# Patient Record
Sex: MALE | Race: WHITE | NOT HISPANIC OR LATINO | Employment: OTHER | URBAN - METROPOLITAN AREA
[De-identification: names, ages, dates, MRNs, and addresses within clinical notes are randomized per-mention and may not be internally consistent; named-entity substitution may affect disease eponyms.]

---

## 2018-09-15 ENCOUNTER — APPOINTMENT (OUTPATIENT)
Dept: RADIOLOGY | Facility: MEDICAL CENTER | Age: 73
End: 2018-09-15
Attending: EMERGENCY MEDICINE
Payer: MEDICARE

## 2018-09-15 ENCOUNTER — HOSPITAL ENCOUNTER (OUTPATIENT)
Facility: MEDICAL CENTER | Age: 73
End: 2018-09-16
Attending: EMERGENCY MEDICINE | Admitting: ORAL & MAXILLOFACIAL SURGERY
Payer: MEDICARE

## 2018-09-15 DIAGNOSIS — M27.2 ABSCESS OF MANDIBLE: ICD-10-CM

## 2018-09-15 DIAGNOSIS — K04.7 DENTAL INFECTION: ICD-10-CM

## 2018-09-15 LAB
ANION GAP SERPL CALC-SCNC: 9 MMOL/L (ref 0–11.9)
BASOPHILS # BLD AUTO: 0.5 % (ref 0–1.8)
BASOPHILS # BLD: 0.03 K/UL (ref 0–0.12)
BUN SERPL-MCNC: 8 MG/DL (ref 8–22)
CALCIUM SERPL-MCNC: 9.1 MG/DL (ref 8.4–10.2)
CHLORIDE SERPL-SCNC: 101 MMOL/L (ref 96–112)
CO2 SERPL-SCNC: 26 MMOL/L (ref 20–33)
CREAT SERPL-MCNC: 1.04 MG/DL (ref 0.5–1.4)
EOSINOPHIL # BLD AUTO: 0.08 K/UL (ref 0–0.51)
EOSINOPHIL NFR BLD: 1.3 % (ref 0–6.9)
ERYTHROCYTE [DISTWIDTH] IN BLOOD BY AUTOMATED COUNT: 39.2 FL (ref 35.9–50)
GLUCOSE SERPL-MCNC: 111 MG/DL (ref 65–99)
HCT VFR BLD AUTO: 40.1 % (ref 42–52)
HGB BLD-MCNC: 14 G/DL (ref 14–18)
IMM GRANULOCYTES # BLD AUTO: 0.02 K/UL (ref 0–0.11)
IMM GRANULOCYTES NFR BLD AUTO: 0.3 % (ref 0–0.9)
INR PPP: 1.05 (ref 0.87–1.13)
LYMPHOCYTES # BLD AUTO: 0.85 K/UL (ref 1–4.8)
LYMPHOCYTES NFR BLD: 14.1 % (ref 22–41)
MCH RBC QN AUTO: 30.2 PG (ref 27–33)
MCHC RBC AUTO-ENTMCNC: 34.9 G/DL (ref 33.7–35.3)
MCV RBC AUTO: 86.6 FL (ref 81.4–97.8)
MONOCYTES # BLD AUTO: 0.65 K/UL (ref 0–0.85)
MONOCYTES NFR BLD AUTO: 10.8 % (ref 0–13.4)
NEUTROPHILS # BLD AUTO: 4.38 K/UL (ref 1.82–7.42)
NEUTROPHILS NFR BLD: 73 % (ref 44–72)
NRBC # BLD AUTO: 0 K/UL
NRBC BLD-RTO: 0 /100 WBC
PLATELET # BLD AUTO: 206 K/UL (ref 164–446)
PMV BLD AUTO: 9.1 FL (ref 9–12.9)
POTASSIUM SERPL-SCNC: 3.8 MMOL/L (ref 3.6–5.5)
PROTHROMBIN TIME: 13.6 SEC (ref 12–14.6)
RBC # BLD AUTO: 4.63 M/UL (ref 4.7–6.1)
SODIUM SERPL-SCNC: 136 MMOL/L (ref 135–145)
WBC # BLD AUTO: 6 K/UL (ref 4.8–10.8)

## 2018-09-15 PROCEDURE — G0378 HOSPITAL OBSERVATION PER HR: HCPCS

## 2018-09-15 PROCEDURE — 70355 PANORAMIC X-RAY OF JAWS: CPT

## 2018-09-15 PROCEDURE — 96366 THER/PROPH/DIAG IV INF ADDON: CPT | Mod: XU

## 2018-09-15 PROCEDURE — 700105 HCHG RX REV CODE 258: Performed by: EMERGENCY MEDICINE

## 2018-09-15 PROCEDURE — 160048 HCHG OR STATISTICAL LEVEL 1-5: Performed by: ORAL & MAXILLOFACIAL SURGERY

## 2018-09-15 PROCEDURE — 85025 COMPLETE CBC W/AUTO DIFF WBC: CPT

## 2018-09-15 PROCEDURE — 700111 HCHG RX REV CODE 636 W/ 250 OVERRIDE (IP): Performed by: ORAL & MAXILLOFACIAL SURGERY

## 2018-09-15 PROCEDURE — 87070 CULTURE OTHR SPECIMN AEROBIC: CPT

## 2018-09-15 PROCEDURE — 500380 HCHG DRAIN, PENROSE 1/4X12: Performed by: ORAL & MAXILLOFACIAL SURGERY

## 2018-09-15 PROCEDURE — 80048 BASIC METABOLIC PNL TOTAL CA: CPT

## 2018-09-15 PROCEDURE — 501838 HCHG SUTURE GENERAL: Performed by: ORAL & MAXILLOFACIAL SURGERY

## 2018-09-15 PROCEDURE — 160002 HCHG RECOVERY MINUTES (STAT): Performed by: ORAL & MAXILLOFACIAL SURGERY

## 2018-09-15 PROCEDURE — 700102 HCHG RX REV CODE 250 W/ 637 OVERRIDE(OP)

## 2018-09-15 PROCEDURE — 160038 HCHG SURGERY MINUTES - EA ADDL 1 MIN LEVEL 2: Performed by: ORAL & MAXILLOFACIAL SURGERY

## 2018-09-15 PROCEDURE — 85610 PROTHROMBIN TIME: CPT

## 2018-09-15 PROCEDURE — 700101 HCHG RX REV CODE 250

## 2018-09-15 PROCEDURE — 87075 CULTR BACTERIA EXCEPT BLOOD: CPT

## 2018-09-15 PROCEDURE — 96365 THER/PROPH/DIAG IV INF INIT: CPT | Mod: XU

## 2018-09-15 PROCEDURE — 160027 HCHG SURGERY MINUTES - 1ST 30 MINS LEVEL 2: Performed by: ORAL & MAXILLOFACIAL SURGERY

## 2018-09-15 PROCEDURE — 700111 HCHG RX REV CODE 636 W/ 250 OVERRIDE (IP): Performed by: EMERGENCY MEDICINE

## 2018-09-15 PROCEDURE — 96376 TX/PRO/DX INJ SAME DRUG ADON: CPT | Mod: XU

## 2018-09-15 PROCEDURE — 94760 N-INVAS EAR/PLS OXIMETRY 1: CPT

## 2018-09-15 PROCEDURE — 160035 HCHG PACU - 1ST 60 MINS PHASE I: Performed by: ORAL & MAXILLOFACIAL SURGERY

## 2018-09-15 PROCEDURE — A9270 NON-COVERED ITEM OR SERVICE: HCPCS | Performed by: ORAL & MAXILLOFACIAL SURGERY

## 2018-09-15 PROCEDURE — 700102 HCHG RX REV CODE 250 W/ 637 OVERRIDE(OP): Performed by: ORAL & MAXILLOFACIAL SURGERY

## 2018-09-15 PROCEDURE — 87205 SMEAR GRAM STAIN: CPT

## 2018-09-15 PROCEDURE — A9270 NON-COVERED ITEM OR SERVICE: HCPCS

## 2018-09-15 PROCEDURE — 700105 HCHG RX REV CODE 258: Performed by: ORAL & MAXILLOFACIAL SURGERY

## 2018-09-15 PROCEDURE — 99291 CRITICAL CARE FIRST HOUR: CPT

## 2018-09-15 PROCEDURE — 160036 HCHG PACU - EA ADDL 30 MINS PHASE I: Performed by: ORAL & MAXILLOFACIAL SURGERY

## 2018-09-15 PROCEDURE — 700111 HCHG RX REV CODE 636 W/ 250 OVERRIDE (IP)

## 2018-09-15 PROCEDURE — 96375 TX/PRO/DX INJ NEW DRUG ADDON: CPT | Mod: XU

## 2018-09-15 PROCEDURE — 160009 HCHG ANES TIME/MIN: Performed by: ORAL & MAXILLOFACIAL SURGERY

## 2018-09-15 RX ORDER — ATORVASTATIN CALCIUM 20 MG/1
20 TABLET, FILM COATED ORAL NIGHTLY
COMMUNITY

## 2018-09-15 RX ORDER — CYANOCOBALAMIN (VITAMIN B-12) 500 MCG
800 LOZENGE ORAL DAILY
COMMUNITY
End: 2024-01-11

## 2018-09-15 RX ORDER — MAGNESIUM OXIDE 400 MG/1
400 TABLET ORAL DAILY
COMMUNITY
End: 2024-01-11

## 2018-09-15 RX ORDER — METOPROLOL SUCCINATE 100 MG/1
100 TABLET, EXTENDED RELEASE ORAL DAILY
COMMUNITY

## 2018-09-15 RX ORDER — DEXAMETHASONE SODIUM PHOSPHATE 4 MG/ML
4 INJECTION, SOLUTION INTRA-ARTICULAR; INTRALESIONAL; INTRAMUSCULAR; INTRAVENOUS; SOFT TISSUE
Status: DISCONTINUED | OUTPATIENT
Start: 2018-09-15 | End: 2018-09-16 | Stop reason: HOSPADM

## 2018-09-15 RX ORDER — ATORVASTATIN CALCIUM 40 MG/1
80 TABLET, FILM COATED ORAL NIGHTLY
Status: DISCONTINUED | OUTPATIENT
Start: 2018-09-15 | End: 2018-09-16 | Stop reason: HOSPADM

## 2018-09-15 RX ORDER — LEVOTHYROXINE SODIUM 0.07 MG/1
150 TABLET ORAL
Status: DISCONTINUED | OUTPATIENT
Start: 2018-09-15 | End: 2018-09-16 | Stop reason: HOSPADM

## 2018-09-15 RX ORDER — AMOXICILLIN AND CLAVULANATE POTASSIUM 875; 125 MG/1; MG/1
1 TABLET, FILM COATED ORAL 2 TIMES DAILY
COMMUNITY
Start: 2018-09-13 | End: 2024-01-11

## 2018-09-15 RX ORDER — AMLODIPINE BESYLATE 10 MG/1
10 TABLET ORAL DAILY
COMMUNITY

## 2018-09-15 RX ORDER — LISINOPRIL 40 MG/1
40 TABLET ORAL DAILY
COMMUNITY

## 2018-09-15 RX ORDER — IBUPROFEN 400 MG/1
800 TABLET ORAL
Status: DISCONTINUED | OUTPATIENT
Start: 2018-09-15 | End: 2018-09-16 | Stop reason: HOSPADM

## 2018-09-15 RX ORDER — LEVOTHYROXINE SODIUM 0.12 MG/1
125 TABLET ORAL
COMMUNITY

## 2018-09-15 RX ORDER — LIDOCAINE HYDROCHLORIDE AND EPINEPHRINE 5; 5 MG/ML; UG/ML
INJECTION, SOLUTION INFILTRATION; PERINEURAL
Status: DISCONTINUED | OUTPATIENT
Start: 2018-09-15 | End: 2018-09-15 | Stop reason: HOSPADM

## 2018-09-15 RX ORDER — ONDANSETRON 2 MG/ML
4 INJECTION INTRAMUSCULAR; INTRAVENOUS EVERY 4 HOURS PRN
Status: DISCONTINUED | OUTPATIENT
Start: 2018-09-15 | End: 2018-09-16 | Stop reason: HOSPADM

## 2018-09-15 RX ORDER — HYDROCODONE BITARTRATE AND ACETAMINOPHEN 5; 325 MG/1; MG/1
1 TABLET ORAL EVERY 6 HOURS PRN
Status: ON HOLD | COMMUNITY
End: 2018-09-16

## 2018-09-15 RX ORDER — OXYCODONE HYDROCHLORIDE 5 MG/1
5 TABLET ORAL
Status: DISCONTINUED | OUTPATIENT
Start: 2018-09-15 | End: 2018-09-16 | Stop reason: HOSPADM

## 2018-09-15 RX ORDER — MIRTAZAPINE 45 MG/1
45 TABLET, FILM COATED ORAL NIGHTLY
COMMUNITY

## 2018-09-15 RX ORDER — HYDROMORPHONE HYDROCHLORIDE 2 MG/ML
0.5 INJECTION, SOLUTION INTRAMUSCULAR; INTRAVENOUS; SUBCUTANEOUS
Status: DISCONTINUED | OUTPATIENT
Start: 2018-09-15 | End: 2018-09-16 | Stop reason: HOSPADM

## 2018-09-15 RX ORDER — AMLODIPINE BESYLATE 5 MG/1
10 TABLET ORAL DAILY
Status: DISCONTINUED | OUTPATIENT
Start: 2018-09-15 | End: 2018-09-16 | Stop reason: HOSPADM

## 2018-09-15 RX ORDER — LISINOPRIL 20 MG/1
40 TABLET ORAL DAILY
Status: DISCONTINUED | OUTPATIENT
Start: 2018-09-15 | End: 2018-09-16 | Stop reason: HOSPADM

## 2018-09-15 RX ORDER — CEPHALEXIN 500 MG/1
500 CAPSULE ORAL 4 TIMES DAILY
Status: ON HOLD | COMMUNITY
Start: 2018-09-11 | End: 2018-09-16

## 2018-09-15 RX ADMIN — HYDROMORPHONE HYDROCHLORIDE 0.5 MG: 2 INJECTION, SOLUTION INTRAMUSCULAR; INTRAVENOUS; SUBCUTANEOUS at 19:36

## 2018-09-15 RX ADMIN — ATORVASTATIN CALCIUM 80 MG: 40 TABLET, FILM COATED ORAL at 20:39

## 2018-09-15 RX ADMIN — HYDROMORPHONE HYDROCHLORIDE 0.5 MG: 2 INJECTION, SOLUTION INTRAMUSCULAR; INTRAVENOUS; SUBCUTANEOUS at 16:09

## 2018-09-15 RX ADMIN — IBUPROFEN 800 MG: 400 TABLET, FILM COATED ORAL at 18:47

## 2018-09-15 RX ADMIN — HYDROMORPHONE HYDROCHLORIDE 0.5 MG: 2 INJECTION, SOLUTION INTRAMUSCULAR; INTRAVENOUS; SUBCUTANEOUS at 23:24

## 2018-09-15 RX ADMIN — HYDROCODONE BITARTRATE AND ACETAMINOPHEN 30 ML: 7.5; 325 SOLUTION ORAL at 14:35

## 2018-09-15 RX ADMIN — FENTANYL CITRATE 50 MCG: 50 INJECTION, SOLUTION INTRAMUSCULAR; INTRAVENOUS at 14:40

## 2018-09-15 RX ADMIN — SODIUM CHLORIDE 3 G: 900 INJECTION INTRAVENOUS at 23:24

## 2018-09-15 RX ADMIN — SODIUM CHLORIDE 3 G: 900 INJECTION INTRAVENOUS at 18:47

## 2018-09-15 RX ADMIN — FENTANYL CITRATE 50 MCG: 50 INJECTION, SOLUTION INTRAMUSCULAR; INTRAVENOUS at 14:30

## 2018-09-15 RX ADMIN — SODIUM CHLORIDE 3 G: 900 INJECTION INTRAVENOUS at 10:08

## 2018-09-15 ASSESSMENT — COGNITIVE AND FUNCTIONAL STATUS - GENERAL
SUGGESTED CMS G CODE MODIFIER MOBILITY: CH
SUGGESTED CMS G CODE MODIFIER DAILY ACTIVITY: CH
DAILY ACTIVITIY SCORE: 24
MOBILITY SCORE: 24

## 2018-09-15 ASSESSMENT — PATIENT HEALTH QUESTIONNAIRE - PHQ9
1. LITTLE INTEREST OR PLEASURE IN DOING THINGS: NOT AT ALL
SUM OF ALL RESPONSES TO PHQ9 QUESTIONS 1 AND 2: 0
2. FEELING DOWN, DEPRESSED, IRRITABLE, OR HOPELESS: NOT AT ALL

## 2018-09-15 ASSESSMENT — PAIN SCALES - GENERAL
PAINLEVEL_OUTOF10: 4
PAINLEVEL_OUTOF10: 0
PAINLEVEL_OUTOF10: 5
PAINLEVEL_OUTOF10: 6
PAINLEVEL_OUTOF10: 4
PAINLEVEL_OUTOF10: 5
PAINLEVEL_OUTOF10: 4
PAINLEVEL_OUTOF10: 4
PAINLEVEL_OUTOF10: 8

## 2018-09-15 NOTE — OR NURSING
1415 - Patient admitted from OR to PACU - drowsy with spontaneous respirations and clear breath sounds bilaterally. Gauze from inside left lower mouth area. Gauze fluffs dressing to left lower jaw area - both clean, dry, and intact. Per RACHEL - there is a penrose drain under the gauze in left lower jaw area. Ice pack to left lower jaw area. Patient denies pain, denies nausea. Report from RACHEL Rojas and Dr. Hale. Iv noted to right wrist area- infusing well. VS as noted.     1430 - Patient less drowsy. Pain 8/10 - medicating with Fentanyl and Hydrocodone as noted on MAR. Denies nausea . VS as noted.     1445 - Patient resting with eyes closed to easily awakened and cooperative then back to resting with eyes closed. Pain now 4/10 and tolerable. Denies nausea. Tolerating small sips of water. VS as noted.     1500 - Remains as above. VS as noted.     1515 -  Resting with eyes closed to easily awakened and cooperative then back to resting with eyes closed. . Pain 4/10 and tolerable. Denies nausea. Declines any ice chips or water at this time. Gauze from mouth and to left lower jaw area remains clean, dry, and intact. Ice pack in place. IV remains infusing well. VS as noted. Meets criteria for transfer to room. Called [facility] - awaiting RN availability.     1520 - Report to RAMYA Anderson.     1525 - Patient transferred via bed to room on O2 via NC at 2 lpm by another PACU RN and Transport Tech.

## 2018-09-15 NOTE — OR SURGEON
Immediate Post OP Note    PreOp Diagnosis:   1.  Infected tooth number 18.  2.  Left sublingual space abscess.  3.  Left submandibular cellulitis.    PostOp Diagnosis:   1.  Vertical fracture and Infected tooth number 18.  2.  Left sublingual space abscess.  3.  Left submandibular cellulitis.    Procedure(s):  1.  Left submandibular incision and drainage of the sublingual space abscess with Penrose drain placement.   2.  Surgical extraction of tooth number 18.    - Wound Class: Clean with Drain    Surgeon(s):  Adarsh Gee M.D.    Anesthesiologist/Type of Anesthesia:  Anesthesiologist: Jannie Hale M.D.  Anesthesia Technician: Amina Chowdhury/General    Surgical Staff:  Circulator: Silvia Vance R.N.  Scrub Person: Lori Lees    Specimens:  Swabs to microbiology for gs/c/s.    Drains:  1/4 inch penrose drain.    Estimated Blood Loss: Minimal    Findings: Scant pus from the submandibular I&D site.  Penrose drains placed and secured with a 3-0 chromic gut suture.  Obvious vertical fracture of tooth number 18.  Extraction clearly indicated.      Complications: None    Disposition:  Admission to the floor for IV antibiotics, pain control, wound care, and observation.  Anticipate discharge home after 24-48 hours.          9/15/2018 2:10 PM Adarsh Gee M.D.

## 2018-09-15 NOTE — ED NOTES
Med rec complete per patient medication list  Allergies reviewed    Patient was on K[facility] for a couple days and [facility] and told [facility] to start [facility] and D/C Keflex  Stopped vitamins a few ago due to not swallowing well

## 2018-09-15 NOTE — CONSULTS
Oral Surgery Consult    Requesting Physician:  Dr. Justin Gold, [facility] ED    Reason for Consultation:  Left lower jaw pain and swelling.     ID/HPI:  The patient is a 73 y.o. male with hypertension and regular dental care presented to the [facility] ED today complaining of left jaw pain and swelling.  Cephalexin was started this last week by Dr. Mac Flores, but the swelling seemed to worsen.  He was seen yesterday at the [facility] ED and was given IV Unasyn and sent home with Augmentin.  He feels that the swelling and pain is worsening and presented to the [facility] ED for evaluation.  He was seen by Dr. Gold who felt there might be an abscess.  A panoramic mandibular x-ray was obtained and shows overall intact dentition, but a periapical radiolucency associated with the mesial root of tooth number 18.  Difficulty swallowing with pain and limited oral opening has been experienced most recently.  Unasyn 3 g IV administered in the ED.    Medical History: Hypertension, hypercholesterolemia, and hypothyroidism.    Surgical History:  Denies    Medications:  Levothyroxine, amlodipine, metoprolol, lisinopril, atorvastatin, mirtazapine, ASA 81 mg, Vit E/D/B complex.    Allergies:  NKDA    Social History:  Pt lives in [place], recently relocated from [place].  Denies smoking, drinking alcohol, and illicit drug use.      ROS:  Negative for symtpomatic CV, Pulm, GI, , Neuro, or Endocrine disease.   Denies headache, dizziness, CP, SOB.  Recent difficulty swallowing with pain and limited oral opening.      Examination:  T 99.6 F, /87, Pulse 77, Resp 20, SpO2 92% on RA, Ht 5-10, Wt 226 lb  General:  Well developed, well nourished, overweight, age appropriate, no distress.  HEENT:    Head atraumatic, normocephalic.    Eyes:  PERRL, EOMI, normal vision.    Ears:  Hearing grossly intact to finger rub bilaterally.    Nose:  Nares patent bilaterally.    Oropharynx:  Lingual  tenderness in the left lower lingual region with fluctuant swelling.   The oropharynx is clear without shift of midline structures.  The floor of mouth is otherwise soft without tongue elevation.  Submandibular:  Tender on the left, but soft.  Maxillofacial:  Soft swelling of the left mandible extending to the mental region.  Mild trismus. No obvious dental decay.  Tooth number 18 is midly tender.    Chest:  Symmetric chest rise.  No tenderness.  CV:  RRR without murmur by report.  Lungs:  Breathing clear and unlabored.    Abdomen:  Soft, nontender, nondistended.   Extremities:  Warm, without edema, peripheral pulses present.  Neurologic:  CN II-XII grossly intact bilaterally.  Normal mentation and conversation.  Nonfocal exam.      Labs:    1. CBC with WBC 6.0, Hct 40.1, Plt 206, Neuts 73%  2. CMP with glucose 111    Imaging:  Panoramic Mandible - PA lucency of tooth number 18.      Assessment/Plan:  This 73 year old man with extensive dental decay has developed an acute dental infection with abscess formation in the deep left sublingual space with a normal WBC count with mild neutrophilia.  The patient will be admitted for IV antibiotics and will require surgical treatment to include incision and drainage of the abscess and extraction of the causeative tooth, #18.  We discussed saving the tooth with an endodontic consultation, but that removal of the tooth is most predictable way of treating this infection.  The patient is amenable to this treatment recommendation and wishes to proceed with extraction and incision and drainage as discussed.      Consent discussion:  The procedure, risks, benefits, and alternatives have been described and discussed in detail.  Risks include, but are not limited to, persistent infection, bleeding, need for reoperation, postoperative antibiotics, and nerve injury resulting in possible weakness of facial musculature and altered sensation of the facial and oral tissues.  All questions  were answered and the operating room has been scheduled.  NPO status is appropriate for general anesthesia.      Postoperative Disposition:  Admission to the floor observation, pain control, and IV antibiotics.  I will follow closely during the hospital course and advise on drain removal, discharge instructions, and antibiotics.      Thanks to all those involved with the care, admission and discharge of this patient.      Adarsh Gee DDS, MD

## 2018-09-15 NOTE — ED NOTES
Júnior Gasca Ripley 73 y.o. male   Ambulatory to room    Chief Complaint   Patient presents with   • Dental Pain     x1 week, L lower. Placed on Keflex 9/11-9/13.  Switched to Amoxicillin 9/13 and [facility].  Worsening.    • Facial Swelling      Chart placed for ERP eval.

## 2018-09-15 NOTE — ED PROVIDER NOTES
ED Provider Note    CHIEF COMPLAINT  Chief Complaint   Patient presents with   • Dental Pain     x1 week, L lower. Placed on Keflex 9/11-9/13.  Switched to Amoxicillin 9/13 and [facility].  Worsening.    • Facial Swelling       HPI  Júnior Norton is a 73 y.o. male who presents complaining of toothache.  It hurts on the lower left side.  It has been present for approximately a week, but is particularly bad at this time.  Pain radiates locally.  Rates pain as moderate to severe.  It is worse to chew or bite down.  However, patient is able to take orals.  Patient saw his dentist who prescribed Keflex.  The patient did not have any improvement with this.  He was subsequently seen at the [facility] and was given a dose of IV antibiotics.  Antibiotic was then changed to Augmentin.  Is been taking this but seems to be getting worse.  Therefore he comes the emergency department for further evaluation.  No associated breathing/swallowing difficulty.  No fever.  No nausea, vomiting, chest pain, shortness of breath, weakness, numbness, bowel or bladder changes. There are no other complaints.    PAST MEDICAL HISTORY  Past Medical History:   Diagnosis Date   • Depression    • Hyperlipemia    • Hypertension    • Hypothyroid        FAMILY HISTORY  No family history on file.    SOCIAL HISTORY  Social History     Social History   • Marital status:      Spouse name: N/A   • Number of children: N/A   • Years of education: N/A     Social History Main Topics   • Smoking status: Not on file   • Smokeless tobacco: Not on file   • Alcohol use Not on file   • Drug use: Unknown   • Sexual activity: Not on file     Other Topics Concern   • Not on file     Social History Narrative   • No narrative on file       SURGICAL HISTORY  No past surgical history on file.    CURRENT MEDICATIONS  Home Medications     Reviewed by Ishmael Ortega (Pharmacy Tech) on 09/15/18 at 0905  Med List  "Status: Complete   Medication Last Dose Status   amLODIPine (NORVASC) 10 MG Tab 9/15/2018 Active   amoxicillin-clavulanate (AUGMENTIN) 875-125 MG Tab 9/15/2018 Active   aspirin EC (ECOTRIN) 81 MG Tablet Delayed Response 9/15/2018 Active   atorvastatin (LIPITOR) 80 MG tablet 9/14/2018 Active   B Complex Vitamins (VITAMIN B COMPLEX PO) 3 DAYS Active   Calcium Carbonate (CALCIUM 600 PO) 3 DAYS Active   cephALEXin (KEFLEX) 500 MG Cap 9/13/2018 Active   HYDROcodone-acetaminophen (NORCO) 5-325 MG Tab per tablet 9/15/2018 Active   levothyroxine (SYNTHROID) 150 MCG Tab 9/15/2018 Active   lisinopril (PRINIVIL, ZESTRIL) 40 MG tablet 9/15/2018 Active   magnesium oxide (MAG-OX) 400 MG Tab 3 DAYS Active   metoprolol SR (TOPROL XL) 100 MG TABLET SR 24 HR 9/15/2018 Active   mirtazapine (REMERON) 45 MG tablet 9/14/2018 Active   vitamin D (CHOLECALCIFEROL) 1000 UNIT Tab 3 DAYS Active   Vitamin E 400 UNIT Tab 3 DAYS Active                ALLERGIES  No Known Allergies    REVIEW OF SYSTEMS  See HPI for further details. Review of systems as above, otherwise all other systems are negative.     PHYSICAL EXAM  VITAL SIGNS: /87   Pulse 77   Temp 37.6 °C (99.6 °F)   Resp 20   Ht 1.778 m (5' 10\")   Wt 102.9 kg (226 lb 13.7 oz)   SpO2 92%   BMI 32.55 kg/m²     Constitutional: Well appearing patient in mild distress from pain.  Not toxic, nor ill in appearance.  HENT: Mucus membranes moist.  Oropharynx is clear.  There are scattered caries.  There is moderate left-sided over the angle of the mandible.  There is tenderness over the left mandible.  Tongue is normal.  Floor of the mouth is normal.  Submental space is soft.  Posterior pharynx is normal.  Patient is tolerating secretions without difficulty.  Eyes: Pupils equally round.  No scleral icterus.   Neck: Full nontender range of motion; no meningismus.  Lymphatic: No cervical lymphadenopathy noted.   Cardiovascular: Regular heart rate and rhythm.  No murmurs, rubs, nor gallop " appreciated.   Thorax & Lungs: Lungs are clear to auscultation with good air movement bilaterally.  No wheeze, rhonchi, nor rales.   Abdomen: Soft, not distended  Skin: No facial cellulitis.  Extremities/Musculoskeletal: No sign of trauma.  Musculoskeletal: Grossly normal.  Neurologic: Alert & appropriate.  Strength is grossly normal.  Gait is normal about the ER.  Psychiatric: Normal affect appropriate for the clinical situation.    PROCEDURES      MEDICAL RECORD  I have reviewed patient's medical record and pertinent results are listed above.    COURSE & MEDICAL DECISION MAKING  I have reviewed any medical record information, laboratory studies and radiographic results as noted above.    Patient presents today after seeing his [facility]  He has been taking antibiotics without any significant improvement.  He says that he is been worsening after taking antibiotics.  I consulted the on-call oral surgeon.  Dr. Barrow came to the emergency department plans to take the patient to the OR for incision and drainage.    FINAL IMPRESSION  1. Acute toothache  2. Dental caries  3.  Dental infection  4.       Electronically signed by: Justin Gold, 9/15/2018 12:00 PM

## 2018-09-15 NOTE — OP REPORT
DATE OF SERVICE:  09/15/2018    PREOPERATIVE DIAGNOSES:  1.  Infected tooth #18.  2.  Left sublingual space abscess.  3.  Left submandibular cellulitis.    POSTOPERATIVE DIAGNOSES:  1.  Vertical fracture and infected tooth #18.  2.  Left sublingual space abscess.  3.  Left submandibular space cellulitis.    PROCEDURES:  1.  Left submandibular incision and drainage of the sublingual space abscess   with Penrose drain placement.  2.  Surgical extraction of tooth #18.    SURGEON:  Adarsh Gee MD    ASSISTANT SURGEON:  None    ANESTHESIA:  General anesthesia with oral endotracheal intubation.    ANESTHESIOLOGIST:  Jannie Hale MD    INDICATION:  This patient is a 73-year-old man with a recent history of left   lower second molar pain and subsequent swelling with the development of   trismus and painful swallowing.  He had seen his dentist, Dr. Mac Flores and   was prescribed oral antibiotics.  He continued to worsen and was seen at the   [facility] emergency department where he was given an infusion of   Unasyn and continued on Augmentin.  He continued to worsen and presented to   the [facility] emergency department where he   was seen by Dr. Justin Gold.  I was called for consultation.  He was   examined and found to have left lingual tender, swelling, and tenderness of   tooth #18.  A panoramic mandibular x-ray revealed apical lucency suggestive of   a chronic infection, which should become acute.  The findings suggested   either pulpal necrosis or a dental fracture of tooth #18 with subsequent acute   infection.  Considering the trismus extraction of tooth #18 and extraoral   incision and drainage of the sublingual abscess was recommended to be done   under general anesthesia here at [facility] operating room.  Patient   understood the nature of the infection and elected to proceed with extraction   and incision and drainage today.  He had been n.p.o.  except for black coffee   several hours prior to presenting.  The procedure risks, benefits, and   alternatives were described and discussed in detail.  Risks include, but are   not limited to infection, bleeding, need for reoperation, and nerve injury   resulting in altered sensation of the lower lip, chin, and tongue regions and   possibly weakness of the lower lip.  Formal written consent was obtained.    PROCEDURE DESCRIPTION:  Patient was taken to the operating room at [facility] operating room where he was placed in   the supine position and general anesthesia with oral endotracheal intubation   was performed without complication by Dr. Hale.  IV Unasyn had been   administered preoperatively.  He was positioned, prepped, and draped in the   usual fashion for an intraoral and extraoral approach to a deep space oral   infection.    The oropharynx was suctioned and a moistened throat pack was placed.    Approximately 8 mL of 1% lidocaine with 1:100,000 dilution of epinephrine was   administered subcutaneously in the left submandibular region and via a left   mandibular block with local infiltration.  A 1.5 cm incision was made through   skin and subcutaneous tissues with a scalpel.  Blunt dissection was carried   through the superficial layer of the deep cervical fascia and carried out to   the inferior border of the mandible where the sublingual space was entered.    Scant pus was drained.  The site was irrigated.  A Penrose drain was placed   and secured to the skin with a single 3-0 chromic gut suture.    Attention was turned to the mouth.  A bite block was placed.  Tooth #18 was   extracted with periodontal separation, mesial elevator luxation, and forceps   separation and extraction of the teeth in fragments.  A vertical dental   fracture was noted clearly through the central groove of the tooth.  The   socket was curetted thoroughly and irrigated with saline.  The  gingiva was   sutured once with a single 3-0 chromic gut suture.  Gauze was placed over the   extraction site for hemostasis.  The throat pack and the bite block were   removed and the oropharynx was suctioned.  The patient was undraped and   cleansed.  The left submandibular site was dressed with Kerlix, fluff gauze,   and elastic tape.  The patient was turned over to anesthesia for emergence and   extubation.  This was performed without complication by Dr. Hale.  The   patient was transferred to the PACU awake and in stable condition.    ESTIMATED BLOOD LOSS:  Minimal.    SPECIMENS:  Swabs to microbiology for Gram stain, culture, and sensitivity.    DRAINS:  Quarter inch Penrose drain.    COMPLICATIONS:  None.    DISPOSITION:  After recovery in the PACU, the patient will be admitted to the   floor for IV antibiotics, pain control, wound care, and observation.  I   anticipate discharge home after 24-48 hours with drain removal.       ____________________________________     JIGAR JIMENES MD,DDS    BIBIANA / JEROME    DD:  09/15/2018 14:33:58  DT:  09/15/2018 15:37:07    D#:  [id number]  Job#:  [id number]

## 2018-09-15 NOTE — OR NURSING
1225 - Patient admitted from ED to Pre- Op status in PACU. Awake and cooperative. Swelling noted to left lower jaw area. Per patient pain 4-5/10 and tolerable at this time. Confirmed name, , allergies,  NPO status, and surgical procedure with patient. Patient had seen Dr. Gee in ED - Surgical consent signed by patient.     1310 - Dr. Hale at bedside discussing Anesthesia plan with patient - consent signed.      1315 - Spouse to bedside.     1320 - Dr. Gee at bedside. He signed surgical consent.     1325 - Report to RACHEL Rojas and patient to OR.

## 2018-09-16 VITALS
HEIGHT: 70 IN | DIASTOLIC BLOOD PRESSURE: 69 MMHG | RESPIRATION RATE: 18 BRPM | TEMPERATURE: 99.2 F | OXYGEN SATURATION: 95 % | HEART RATE: 73 BPM | WEIGHT: 226.85 LBS | SYSTOLIC BLOOD PRESSURE: 140 MMHG | BODY MASS INDEX: 32.48 KG/M2

## 2018-09-16 PROBLEM — M27.2 ABSCESS OF MANDIBLE: Status: RESOLVED | Noted: 2018-09-16 | Resolved: 2018-09-16

## 2018-09-16 LAB
GRAM STN SPEC: NORMAL
SIGNIFICANT IND 70042: NORMAL
SITE SITE: NORMAL
SOURCE SOURCE: NORMAL

## 2018-09-16 PROCEDURE — 700111 HCHG RX REV CODE 636 W/ 250 OVERRIDE (IP): Performed by: ORAL & MAXILLOFACIAL SURGERY

## 2018-09-16 PROCEDURE — 700102 HCHG RX REV CODE 250 W/ 637 OVERRIDE(OP): Performed by: ORAL & MAXILLOFACIAL SURGERY

## 2018-09-16 PROCEDURE — 700105 HCHG RX REV CODE 258: Performed by: ORAL & MAXILLOFACIAL SURGERY

## 2018-09-16 PROCEDURE — 700101 HCHG RX REV CODE 250: Performed by: ORAL & MAXILLOFACIAL SURGERY

## 2018-09-16 PROCEDURE — 96366 THER/PROPH/DIAG IV INF ADDON: CPT

## 2018-09-16 PROCEDURE — A9270 NON-COVERED ITEM OR SERVICE: HCPCS | Performed by: ORAL & MAXILLOFACIAL SURGERY

## 2018-09-16 PROCEDURE — G0378 HOSPITAL OBSERVATION PER HR: HCPCS

## 2018-09-16 RX ORDER — IBUPROFEN 800 MG/1
800 TABLET ORAL EVERY 8 HOURS PRN
Qty: 8 TAB | Refills: 1 | Status: SHIPPED | OUTPATIENT
Start: 2018-09-16 | End: 2018-09-19

## 2018-09-16 RX ORDER — BACITRACIN ZINC AND POLYMYXIN B SULFATE 500; 1000 [USP'U]/G; [USP'U]/G
OINTMENT TOPICAL 2 TIMES DAILY
Status: DISCONTINUED | OUTPATIENT
Start: 2018-09-16 | End: 2018-09-16 | Stop reason: HOSPADM

## 2018-09-16 RX ORDER — HYDROCODONE BITARTRATE AND ACETAMINOPHEN 5; 325 MG/1; MG/1
1-2 TABLET ORAL EVERY 6 HOURS PRN
Qty: 12 TAB | Refills: 0 | Status: SHIPPED | OUTPATIENT
Start: 2018-09-16 | End: 2018-09-19

## 2018-09-16 RX ADMIN — SODIUM CHLORIDE 3 G: 900 INJECTION INTRAVENOUS at 05:31

## 2018-09-16 RX ADMIN — SODIUM CHLORIDE 3 G: 900 INJECTION INTRAVENOUS at 11:48

## 2018-09-16 RX ADMIN — OXYCODONE HYDROCHLORIDE 5 MG: 5 TABLET ORAL at 11:47

## 2018-09-16 RX ADMIN — BACITRACIN ZINC AND POLYMYXIN B SULFATE: 500; 10000 OINTMENT TOPICAL at 10:30

## 2018-09-16 RX ADMIN — LISINOPRIL 40 MG: 20 TABLET ORAL at 05:32

## 2018-09-16 RX ADMIN — AMLODIPINE BESYLATE 10 MG: 5 TABLET ORAL at 05:31

## 2018-09-16 RX ADMIN — IBUPROFEN 800 MG: 400 TABLET, FILM COATED ORAL at 07:44

## 2018-09-16 RX ADMIN — OXYCODONE HYDROCHLORIDE 5 MG: 5 TABLET ORAL at 05:32

## 2018-09-16 RX ADMIN — OXYCODONE HYDROCHLORIDE 5 MG: 5 TABLET ORAL at 08:37

## 2018-09-16 RX ADMIN — LEVOTHYROXINE SODIUM 150 MCG: 75 TABLET ORAL at 05:32

## 2018-09-16 RX ADMIN — IBUPROFEN 800 MG: 400 TABLET, FILM COATED ORAL at 11:46

## 2018-09-16 ASSESSMENT — PAIN SCALES - GENERAL
PAINLEVEL_OUTOF10: 5
PAINLEVEL_OUTOF10: 5

## 2018-09-16 NOTE — DISCHARGE INSTRUCTIONS
Discharge Instructions    Discharged to home by car with relative. Discharged via wheelchair, hospital escort: Yes.  Special equipment needed: Not Applicable    Be sure to schedule a follow-up appointment with your primary care doctor or any specialists as instructed.     Discharge Plan:   Influenza Vaccine Indication: Patient Refuses    I understand that a diet low in cholesterol, fat, and sodium is recommended for good health. Unless I have been given specific instructions below for another diet, I accept this instruction as my diet prescription.   Other diet: diet as tolerated    Special Instructions: None    · Is patient discharged on Warfarin / Coumadin?   No   Warm compresses to face every 4 hours while awake  Change bandage twice a day and apply antibiotic ointment  Warm salt water rinses every 4 hours while awake  Call Monday For appointment with .#[phone]  Call md for fever,increased swelling,purulent drainage  Depression / Suicide Risk    As you are discharged from this [facility] facility, it is important to learn how to keep safe from harming yourself.    Recognize the warning signs:  · Abrupt changes in personality, positive or negative- including increase in energy   · Giving away possessions  · Change in eating patterns- significant weight changes-  positive or negative  · Change in sleeping patterns- unable to sleep or sleeping all the time   · Unwillingness or inability to communicate  · Depression  · Unusual sadness, discouragement and loneliness  · Talk of wanting to die  · Neglect of personal appearance   · Rebelliousness- reckless behavior  · Withdrawal from people/activities they love  · Confusion- inability to concentrate     If you or a loved one observes any of these behaviors or has concerns about self-harm, here's what you can do:  · Talk about it- your feelings and reasons for harming yourself  · Remove any means that you might use to hurt yourself (examples: pills, rope,  extension cords, firearm)  · Get professional help from the community (Mental Health, Substance Abuse, psychological counseling)  · Do not be alone:Call your Safe Contact- someone whom you trust who will be there for you.  · Call your local CRISIS HOTLINE 622-9418 or 684-967-4439  · Call your local Children's Mobile Crisis Response Team [place] (136) 449-4359 or www.TBS  · Call the toll free National Suicide Prevention Hotlines   · National Suicide Prevention Lifeline 646-615-LDVG (4984)  · National Hope Line Network 800-SUICIDE (990-4703)

## 2018-09-16 NOTE — PROGRESS NOTES
Received from pacu via bed.aa&o x 4/  Iv fluids running.  Gauze drsg cdi to lt face/jaw area.  Also has gauze inside of lt side of his mouth.  C/o pain.will medicate with dilaudid per pt request.  Taking in sips of water and ice chips.does not want anything more at this time.  Hob up >30 degrees.ice pack to lt side of face in place from pacu.  Wife at bedside.went over meds and orders with both pt and wife.

## 2018-09-16 NOTE — PROGRESS NOTES
Pt voided 500 cc in the br earlier.up with sba.was also able to swallow ibuprofen whole with water.

## 2018-09-16 NOTE — PROGRESS NOTES
Oral Surgery Progress    POD #1  Operation:  Left submandibular I&D of the sublingual space, extraction of tooth number 18.  Complaints:  Neck soreness near the incision.  No fever or chills.  No bad taste.  Issues overnight:  None  Pt is ambulating, voiding, and tolerating PO.  Pain is controlled with Oxycodone 5 mg.    Exam:  AF, VSSN  Pt appears well in no distress.  Surgical sites clean and hemostatic.  Swelling improved.  Drainage serosanguinous.  Drain removed and site dressed.    Labs:  None new.    A/P:  Pt doing well POD #1 s/p extraction and I&D abscess and meets criteria for discharge.      Discharge home with oral care supplies.  Warm compresses to swollen areas 2-3 times daily for 5 days.  Diet soft.  Salt water rinses 3-4 times daily.  Brush teeth BID carefully around sutured sites.    Follow-up:  1 week in office.      Prescription:  1.  Pt has Augmentin   2.  Percocet 5/325 mg (12) 1-2 po q6h prn pain.    Adarsh Gee DDS, MD  [phone]

## 2018-09-16 NOTE — CARE PLAN
Problem: Safety  Goal: Will remain free from injury  Outcome: PROGRESSING AS EXPECTED      Problem: Venous Thromboembolism (VTW)/Deep Vein Thrombosis (DVT) Prevention:  Goal: Patient will participate in Venous Thrombosis (VTE)/Deep Vein Thrombosis (DVT)Prevention Measures  Outcome: PROGRESSING AS EXPECTED   09/15/18 2300   Mechanical/VTE Prophylaxis   Mechanical Prophylaxis  SCDs, Sequential Compression Device     Patient ambulating

## 2018-09-16 NOTE — PROGRESS NOTES
Report received from day shift RN. Patient laying in bed, watching TV. States pain is 4/10 to left lower jaw. Medicated per MAR. Dressing to left lower jaw has moderate amount of serosanguinous fluid. Patient states that he would like to hold off on changing gauze in mouth, oral rinses, and brushing teeth at this time. Education provided to patient. Will continue to monitor.

## 2018-09-16 NOTE — PROGRESS NOTES
Went over all d/c instructions and meds with pt and wife.all questions answered.  Pt.chose to walk out with wife

## 2018-09-16 NOTE — DISCHARGE SUMMARY
Oral & Facial Surgery   Discharge Summary  Pt Name:   Júnior Norton  Pt mrn:  [id number]    Admitting Service:  Oral and Maxillofacial Surgery.    Discharge Diagnoses:  1.  Infected tooth number 18 due to fracture.  2.  Left sublingual space abscess.    Procedures Performed:  1.  Surgical extraction of tooth number 18.  2.  Extraoral incision and drainage of the left sublingual space abscess.     Surgeon:  Adarsh Gee MD, DDS    Pt evaluated for facial infection caused by tooth number 18.   Radiographic and clinical findings were consistent with the admitting diagnosis.  It was decided that the pt would benefit from surgery at [facility].  The patient was taken to the operating room for the above procedures.  The surgery was performed without complication and the pt was admitted to the floor for IV Abx, IV pain meds and neurological observation.  On hospital day #1 pt was ambulating without assistance, voiding and taking adequate nutrition and fluids.  His pain was controled with PO pain meds and he was felt to be neurologically stable.  The patient was prepared for discharge.     Instructions:    1.  Diet soft.  2.  Wound and oral care with salt water rinses and tooth brushing.  3.  Head elevation and warm compresses for 3-4 days.  4.  OK to shower with dressing off, then redress.   5.  Call or return to hospital if fever, chills, increasing swelling, difficulty swallowing.    Rx:    1.  Percocet 5/325 mg 1-2 PO q6h prn pain (#12).  2.  Ibuprofen 800 mg 1 PO q8h prn pain (8)  2.  Pt has Augmenting for 7 days filled on prior prescription.    F/U in office in 4-5    Adarsh Gee MD, DDS

## 2018-09-17 LAB
BACTERIA WND AEROBE CULT: NORMAL
GRAM STN SPEC: NORMAL
SIGNIFICANT IND 70042: NORMAL
SITE SITE: NORMAL
SOURCE SOURCE: NORMAL

## 2018-09-22 LAB
BACTERIA SPEC ANAEROBE CULT: ABNORMAL
BACTERIA SPEC ANAEROBE CULT: ABNORMAL
SIGNIFICANT IND 70042: ABNORMAL
SITE SITE: ABNORMAL
SOURCE SOURCE: ABNORMAL

## 2020-08-14 ENCOUNTER — OFFICE VISIT (OUTPATIENT)
Dept: DERMATOLOGY | Facility: IMAGING CENTER | Age: 75
End: 2020-08-14
Payer: MEDICARE

## 2020-08-14 VITALS — HEIGHT: 69 IN | TEMPERATURE: 98.7 F | BODY MASS INDEX: 33.33 KG/M2 | WEIGHT: 225 LBS

## 2020-08-14 DIAGNOSIS — L82.1 SEBORRHEIC KERATOSES: ICD-10-CM

## 2020-08-14 DIAGNOSIS — D22.9 MULTIPLE NEVI: ICD-10-CM

## 2020-08-14 DIAGNOSIS — Z85.828 HISTORY OF BASAL CELL CANCER: ICD-10-CM

## 2020-08-14 DIAGNOSIS — Z12.83 SKIN CANCER SCREENING: ICD-10-CM

## 2020-08-14 DIAGNOSIS — L81.4 LENTIGINES: ICD-10-CM

## 2020-08-14 DIAGNOSIS — D18.01 CHERRY ANGIOMA: ICD-10-CM

## 2020-08-14 DIAGNOSIS — L57.0 ACTINIC KERATOSES: ICD-10-CM

## 2020-08-14 PROCEDURE — 17000 DESTRUCT PREMALG LESION: CPT | Performed by: NURSE PRACTITIONER

## 2020-08-14 PROCEDURE — 17003 DESTRUCT PREMALG LES 2-14: CPT | Performed by: NURSE PRACTITIONER

## 2020-08-14 PROCEDURE — 99202 OFFICE O/P NEW SF 15 MIN: CPT | Mod: 25 | Performed by: NURSE PRACTITIONER

## 2020-08-14 ASSESSMENT — ENCOUNTER SYMPTOMS
CHILLS: 0
FEVER: 0
DIAPHORESIS: 0
WEIGHT LOSS: 0

## 2020-08-14 NOTE — PROGRESS NOTES
Dermatology New Patient Visit    Chief Complaint   Patient presents with   • Establish Care       Subjective:     HPI:   Júnior Norton is a 74 y.o. male presenting for    New patient RAJESH   No new concerns today   Last exam 2 years ago , regularly skin exams once a year   History of skin cancer: Yes, Details: BCC right cheek  Mohs 10 years ago , BCC on right hip 15 yrs ago   Several AK treated through the years   History of biopsies:Yes, Details:   History of blistering/severe sunburns:Yes, Details:   Family history of skin cancer:No  Family history of atypical moles:No    Tobacco use: Former smoker. Quit 53  years ago. Smoked 3 pack per day for 10 years  Alcohol use:denies alcohol consumption  Allergies:No        Past Medical History:   Diagnosis Date   • Depression    • Hyperlipemia    • Hypertension    • Hypothyroid        Current Outpatient Medications on File Prior to Visit   Medication Sig Dispense Refill   • levothyroxine (SYNTHROID) 150 MCG Tab Take 150 mcg by mouth Every morning on an empty stomach.     • amLODIPine (NORVASC) 10 MG Tab Take 10 mg by mouth every day.     • metoprolol SR (TOPROL XL) 100 MG TABLET SR 24 HR Take 100 mg by mouth every day.     • lisinopril (PRINIVIL, ZESTRIL) 40 MG tablet Take 40 mg by mouth every day.     • atorvastatin (LIPITOR) 80 MG tablet Take 80 mg by mouth every evening.     • mirtazapine (REMERON) 45 MG tablet Take 45 mg by mouth every evening.     • aspirin EC (ECOTRIN) 81 MG Tablet Delayed Response Take 81 mg by mouth every 48 hours.     • Vitamin E 400 UNIT Tab Take 800 Units by mouth every day.     • vitamin D (CHOLECALCIFEROL) 1000 UNIT Tab Take 1,000 Units by mouth every day.     • B Complex Vitamins (VITAMIN B COMPLEX PO) Take 1 Tab by mouth every day.     • Calcium Carbonate (CALCIUM 600 PO) Take 1 Tab by mouth every day.     • magnesium oxide (MAG-OX) 400 MG Tab Take 400 mg by mouth every day.     • amoxicillin-clavulanate (AUGMENTIN) 875-125 MG Tab Take  1 Tab by mouth 2 times a day. 10 DAYS       No current facility-administered medications on file prior to visit.        No Known Allergies    History reviewed. No pertinent family history.    Social History     Socioeconomic History   • Marital status:      Spouse name: Not on file   • Number of children: Not on file   • Years of education: Not on file   • Highest education level: Not on file   Occupational History   • Not on file   Social Needs   • Financial resource strain: Not on file   • Food insecurity     Worry: Not on file     Inability: Not on file   • Transportation needs     Medical: Not on file     Non-medical: Not on file   Tobacco Use   • Smoking status: Not on file   Substance and Sexual Activity   • Alcohol use: Not on file   • Drug use: Not on file   • Sexual activity: Not on file   Lifestyle   • Physical activity     Days per week: Not on file     Minutes per session: Not on file   • Stress: Not on file   Relationships   • Social connections     Talks on phone: Not on file     Gets together: Not on file     Attends Voodoo service: Not on file     Active member of club or organization: Not on file     Attends meetings of clubs or organizations: Not on file     Relationship status: Not on file   • Intimate partner violence     Fear of current or ex partner: Not on file     Emotionally abused: Not on file     Physically abused: Not on file     Forced sexual activity: Not on file   Other Topics Concern   • Not on file   Social History Narrative   • Not on file       Review of Systems   Constitutional: Negative for chills, diaphoresis, fever, malaise/fatigue and weight loss.   Skin: Negative for itching and rash.        Objective:     A full mucocutaneous exam was completed including: scalp, hair, ears, face, eyelids, conjunctiva, lips, gums/tongue/oropharynx, neck, chest, abdomen, back, left and right upper extremities (including hands/digits and fingernails), left and right lower extremities  "(including feet/toes, toenails), buttocks, including external genitalia  with the following pertinent findings listed below. Remaining above-listed examined areas within normal limits / negative for rashes or lesions.    Temp 37.1 °C (98.7 °F)   Ht 1.753 m (5' 9\")   Wt 102.1 kg (225 lb)     Physical Exam   Constitutional: He is oriented to person, place, and time and well-developed, well-nourished, and in no distress. No distress.   HENT:   Head: Normocephalic.       Right Ear: External ear normal.   Left Ear: External ear normal.   Mouth/Throat: Oropharynx is clear and moist.   Eyes: Conjunctivae are normal.   Neck: Neck supple.   Pulmonary/Chest: Effort normal.   Lymphadenopathy:     He has no cervical adenopathy.   Neurological: He is alert and oriented to person, place, and time.   Skin: Skin is warm and dry. No rash noted. There is erythema.   Ill-defined erythematous gritty/scaly papules over the forearms    Several scattered tan and light brown macules over trunk and extremities    Multiple light brown medium brown skin-colored macules papules scattered over the trunk >> extremities. All with benign-appearing pigment network patterns on dermoscopy    Several scattered 1-3mm bright red macules and thin papules on the trunk    Several tan skin-colored stuck-on waxy papules scattered on the trunk and extremities           Psychiatric: Mood and affect normal.   Vitals reviewed.      DATA: none applicable to review    Assessment and Plan:     1. Actinic keratoses  CRYOTHERAPY:  Risks (including, but not limited to: hypo or hyperpigmentation, redness, blister, blood blister, recurrence, need for further treatment, infection, scar) and benefits of cryotherapy discussed. Patient verbally agreed to proceed with treatment. 2 cryotherapy freeze thaw cycles of 10 seconds were applied to 7 lesions on face and forearms  with cryac. Patient tolerated procedure well. Aftercare instructions given.      2. Cherry angioma  - " Benign-appearing nature of lesions discussed. Advised to return to clinic for any new or concerning changes.      3. Lentigines  - Discussed benign nature of lesions, related to sun exposure  - Discussed sun protection, hand out provided      4. Multiple nevi  - Benign-appearing nature of lesions discussed. Advised to return to clinic for any new or concerning changes.  - ABCDE's of melanoma discussed      5. Seborrheic keratoses  - Benign-appearing nature of lesions discussed. Advised to return to clinic for any new or concerning changes.      6. History of basal cell cancer  Skin education as below    7. Skin cancer screening  Skin cancer education  - discussed importance of sun protective clothing, eyewear  - discussed importance of daily use of broad spectrum sunscreen with SPF 30 or greater, as well as need for reapplication ~every 2 hours when exposed to UVR  - discussed importance of regular self-exams, ideally once per month, every 12 months exams in clinic  - ABCDE's of melanoma discussed  - patient to bring any new or concerning lesions to my attention  - Patient educational handout provided and reviewed with patient        Followup: Return in about 1 year (around 8/14/2021) for RAJESH or sooner for any concerns.    RANDALL Rey.

## 2021-01-15 DIAGNOSIS — Z23 NEED FOR VACCINATION: ICD-10-CM

## 2021-02-03 ENCOUNTER — IMMUNIZATION (OUTPATIENT)
Dept: FAMILY PLANNING/WOMEN'S HEALTH CLINIC | Facility: IMMUNIZATION CENTER | Age: 76
End: 2021-02-03
Attending: INTERNAL MEDICINE
Payer: MEDICARE

## 2021-02-03 DIAGNOSIS — Z23 NEED FOR VACCINATION: ICD-10-CM

## 2021-02-03 DIAGNOSIS — Z23 ENCOUNTER FOR VACCINATION: Primary | ICD-10-CM

## 2021-02-03 PROCEDURE — 0001A PFIZER SARS-COV-2 VACCINE: CPT

## 2021-02-03 PROCEDURE — 91300 PFIZER SARS-COV-2 VACCINE: CPT

## 2021-02-24 ENCOUNTER — IMMUNIZATION (OUTPATIENT)
Dept: FAMILY PLANNING/WOMEN'S HEALTH CLINIC | Facility: IMMUNIZATION CENTER | Age: 76
End: 2021-02-24
Attending: INTERNAL MEDICINE
Payer: MEDICARE

## 2021-02-24 DIAGNOSIS — Z23 ENCOUNTER FOR VACCINATION: Primary | ICD-10-CM

## 2021-02-24 PROCEDURE — 0002A PFIZER SARS-COV-2 VACCINE: CPT

## 2021-02-24 PROCEDURE — 91300 PFIZER SARS-COV-2 VACCINE: CPT

## 2022-06-14 ENCOUNTER — OFFICE VISIT (OUTPATIENT)
Dept: DERMATOLOGY | Facility: IMAGING CENTER | Age: 77
End: 2022-06-14
Payer: MEDICARE

## 2022-06-14 DIAGNOSIS — L57.0 ACTINIC KERATOSIS: ICD-10-CM

## 2022-06-14 DIAGNOSIS — D22.9 MULTIPLE NEVI: ICD-10-CM

## 2022-06-14 DIAGNOSIS — D18.01 CHERRY ANGIOMA: ICD-10-CM

## 2022-06-14 DIAGNOSIS — L82.1 SK (SEBORRHEIC KERATOSIS): ICD-10-CM

## 2022-06-14 DIAGNOSIS — Z12.83 SKIN CANCER SCREENING: ICD-10-CM

## 2022-06-14 DIAGNOSIS — L85.3 XEROSIS CUTIS: ICD-10-CM

## 2022-06-14 DIAGNOSIS — L81.4 LENTIGINES: ICD-10-CM

## 2022-06-14 PROCEDURE — 17003 DESTRUCT PREMALG LES 2-14: CPT | Performed by: NURSE PRACTITIONER

## 2022-06-14 PROCEDURE — 99213 OFFICE O/P EST LOW 20 MIN: CPT | Mod: 25 | Performed by: NURSE PRACTITIONER

## 2022-06-14 PROCEDURE — 17000 DESTRUCT PREMALG LESION: CPT | Performed by: NURSE PRACTITIONER

## 2022-06-14 NOTE — PROGRESS NOTES
DERMATOLOGY NOTE  FOLLOW UP VISIT       Chief complaint: ernestina            History of skin cancer: Yes, Details: BCC right cheek  Mohs approx 2012, BCC on right hip approx 1991 or 1992    Several AK treated through the years   History of biopsies:Yes, Details:   History of blistering/severe sunburns:Yes, Details:   Family history of skin cancer:No  Family history of atypical moles:No     Tobacco use: Former smoker. Quit 53  years ago. Smoked 3 pack per day for 10 years  Alcohol use:denies alcohol consumption  Allergies:No        No Known Allergies     MEDICATIONS:  Medications relevant to specialty reviewed.     REVIEW OF SYSTEMS:   Positive for skin (see HPI)  Negative for fevers and chills       EXAM:  There were no vitals taken for this visit.  Constitutional: Well-developed, well-nourished, and in no distress.     A total body skin exam was performed excluding the genitals per patient preference and including the following areas: head (including face), neck, chest, abdomen, groin/buttocks, back, bilateral upper extremities, and bilateral lower extremities with the following pertinent findings listed below and/or in assessment/plan.     Ill-defined erythematous gritty/scaly papule over the forehead crown vertex,Lt temple, bilateral preauricular    -sun exposed skin of trunk and b/l upper, lower extremities and face with scattered clinically benign light brown reticulated macules all of which were morphologically similar and none of which were suspicious for skin cancer today on exam    Several scattered 1-3mm bright red macules and thin papules on the trunk     Several tan medium brown skin-colored stuck-on waxy papules scattered on the trunk     Multiple tan medium brown dark brown skin-colored macules papules scattered over the trunk and extremities, All with benign-appearing pigment network patterns on dermoscopy    Xerosis cutis bilateral lower extremity       IMPRESSION / PLAN:    1. Actinic keratosis  - NMSC  education/counseling   CRYOTHERAPY:  Risks (including, but not limited to: skin discoloration, redness, blister, blood blister, recurrence, need for further treatment, infection, scar) and benefits of cryotherapy discussed. Patient verbally agreed to proceed with treatment. 1 cryotherapy freeze thaw cycles of 10 seconds were applied to 14 lesions on areas as noted on exam with cryac. Patient tolerated procedure well. Aftercare instructions given--no specific care needed unless irritated during healing process, can apply Vaseline with small band-aid if needed.      2. Lentigines  - Benign-appearing nature of lesions discussed during exam.   - Advised to continue to monitor for any return to clinic for new or concerning changes.      3. Cherry angioma  - Benign-appearing nature of lesions discussed during exam.   - Advised to continue to monitor for any return to clinic for new or concerning changes.      4. Multiple nevi  - Benign-appearing nature of lesions discussed during exam.   - Advised to continue to monitor for any return to clinic for new or concerning changes.  - ABCDE's of melanoma discussed      5. SK (seborrheic keratosis)  - Benign-appearing nature of lesions discussed during exam.   - Advised to continue to monitor for any return to clinic for new or concerning changes.      6. Xerosis cutis  - advised adequate moisturizing with emollients    7. Skin cancer screening  Skin cancer education  - discussed importance of sun protective clothing, eyewear in addition to the use of broad spectrum sunscreen with SPF 30 or greater, as well as need for reapplication ~every 2 hours when exposed to UVR  - discussed importance following up for any new or changing lesions as noted in handout given, but every 12 months exams in clinic in the setting of dermatologic history  - ABCDE's of melanoma discussed/handout given          Discussed risks, benefits, alternative treatments as well as common side effects associated  with prescribed treatment, Patient verbalized understanding and agrees with plan regarding the above        Please note that this dictation was created using voice recognition software. I have made every reasonable attempt to correct obvious errors, but I expect that there are errors of grammar and possibly content that I did not discover before finalizing the note.      Return to clinic in: Return in about 6 weeks (around 7/26/2022) for AK recheck, RAJESH 1 year. and as needed for any new or changing skin lesions.

## 2022-07-26 ENCOUNTER — OFFICE VISIT (OUTPATIENT)
Dept: DERMATOLOGY | Facility: IMAGING CENTER | Age: 77
End: 2022-07-26
Payer: MEDICARE

## 2022-07-26 DIAGNOSIS — L57.0 ACTINIC KERATOSIS: ICD-10-CM

## 2022-07-26 PROCEDURE — 17000 DESTRUCT PREMALG LESION: CPT | Performed by: NURSE PRACTITIONER

## 2022-07-26 PROCEDURE — 17003 DESTRUCT PREMALG LES 2-14: CPT | Performed by: NURSE PRACTITIONER

## 2022-07-26 NOTE — PROGRESS NOTES
DERMATOLOGY NOTE  FOLLOW UP VISIT       Chief complaint:   Follow up, improved with some remaining AKs      Spot check  forehead crown vertex,Lt temple, bilateral preauricular         History of skin cancer: Yes, Details: BCC right cheek  Mohs approx 2012, BCC on right hip approx 1991 or 1992    Several AK treated through the years   History of biopsies:Yes, Details:   History of blistering/severe sunburns:Yes, Details:   Family history of skin cancer:No  Family history of atypical moles:No     Tobacco use: Former smoker. Quit 53  years ago. Smoked 3 pack per day for 10 years  Alcohol use:denies alcohol consumption  Allergies:No        No Known Allergies     MEDICATIONS:  Medications relevant to specialty reviewed.     REVIEW OF SYSTEMS:   Positive for skin (see HPI)  Negative for fevers and chills       EXAM:  There were no vitals taken for this visit.  Constitutional: Well-developed, well-nourished, and in no distress.     A focused skin exam was performed including the affected areas of the head (including face). Notable findings on exam today listed below and/or in assessment/plan.     Ill-defined erythematous gritty/scaly papules over the forehead, bilateral temples, R preauricular region and crown      IMPRESSION / PLAN:    1. Actinic keratosis  CRYOTHERAPY:  Risks (including, but not limited to: skin discoloration, redness, blister, blood blister, recurrence, need for further treatment, infection, scar) and benefits of cryotherapy discussed. Patient verbally agreed to proceed with treatment. 1 cryotherapy freeze thaw cycles of 10 seconds were applied to 7 lesions on areas as noted on exam with cryac. Patient tolerated procedure well. Aftercare instructions given--no specific care needed unless irritated during healing process, can apply Vaseline with small band-aid if needed.          Discussed risks, benefits, alternative treatments as well as common side effects associated with prescribed treatment, Patient  verbalized understanding and agrees with plan regarding the above        Please note that this dictation was created using voice recognition software. I have made every reasonable attempt to correct obvious errors, but I expect that there are errors of grammar and possibly content that I did not discover before finalizing the note.      Return to clinic in: Return for PRN. and as needed for any new or changing skin lesions.

## 2022-11-10 ENCOUNTER — PATIENT MESSAGE (OUTPATIENT)
Dept: HEALTH INFORMATION MANAGEMENT | Facility: OTHER | Age: 77
End: 2022-11-10

## 2023-08-29 ENCOUNTER — OFFICE VISIT (OUTPATIENT)
Dept: DERMATOLOGY | Facility: IMAGING CENTER | Age: 78
End: 2023-08-29
Payer: MEDICARE

## 2023-08-29 DIAGNOSIS — L82.1 SK (SEBORRHEIC KERATOSIS): ICD-10-CM

## 2023-08-29 DIAGNOSIS — L85.1 STUCCO KERATOSES: ICD-10-CM

## 2023-08-29 DIAGNOSIS — D18.01 CHERRY ANGIOMA: ICD-10-CM

## 2023-08-29 DIAGNOSIS — Z85.828 HISTORY OF BASAL CELL CARCINOMA (BCC): ICD-10-CM

## 2023-08-29 DIAGNOSIS — Z12.83 SKIN CANCER SCREENING: ICD-10-CM

## 2023-08-29 DIAGNOSIS — L57.0 ACTINIC KERATOSIS: ICD-10-CM

## 2023-08-29 DIAGNOSIS — L85.3 XEROSIS CUTIS: ICD-10-CM

## 2023-08-29 DIAGNOSIS — D22.9 MULTIPLE NEVI: ICD-10-CM

## 2023-08-29 DIAGNOSIS — L81.4 LENTIGINES: ICD-10-CM

## 2023-08-29 PROCEDURE — 17003 DESTRUCT PREMALG LES 2-14: CPT | Performed by: NURSE PRACTITIONER

## 2023-08-29 PROCEDURE — 99213 OFFICE O/P EST LOW 20 MIN: CPT | Mod: 25 | Performed by: NURSE PRACTITIONER

## 2023-08-29 PROCEDURE — 17000 DESTRUCT PREMALG LESION: CPT | Performed by: NURSE PRACTITIONER

## 2023-08-29 NOTE — PROGRESS NOTES
DERMATOLOGY NOTE  FOLLOW UP VISIT       Chief complaint: ernestina      HPI:  skin lesion , gets irrigated every time he shaves   Location:  left cheek   Time present: 1 month   Painful lesion: No  Itching lesion: No  Enlarging lesion: No  Anything make it better or worse?no       Hx of AK  forehead crown vertex,Lt temple, bilateral preauricular  History of skin cancer: Yes, Details: BCC right cheek  Mohs approx 2012, BCC on right hip approx 1991 or 1992    Several AK treated through the years   History of biopsies:Yes, Details:   History of blistering/severe sunburns:Yes, Details:   Family history of skin cancer:No  Family history of atypical moles:No     Tobacco use: Former smoker. Quit 53  years ago. Smoked 3 pack per day for 10 years  Alcohol use:denies alcohol consumption  Allergies:No        No Known Allergies     MEDICATIONS:  Medications relevant to specialty reviewed.     REVIEW OF SYSTEMS:   Positive for skin (see HPI)  Negative for fevers and chills       EXAM:  There were no vitals taken for this visit.  Constitutional: Well-developed, well-nourished, and in no distress.     A total body skin exam was performed excluding the genitals per patient preference and including the following areas: head (including face), neck, chest, abdomen, groin/buttocks, back, bilateral upper extremities, and bilateral lower extremities with the following pertinent findings listed below and/or in assessment/plan.         -sun exposed skin of trunk and b/l upper, lower extremities and face with scattered clinically benign light brown reticulated macules all of which were morphologically similar and none of which were suspicious for skin cancer today on exam  Several scattered 1-3mm bright red macules and thin papules on the trunk, scalp and extremities   Several tan medium brown skin-colored stuck-on waxy papules scattered on the trunk and extremities  Multiple tan medium brown dark brown skin-colored macules papules scattered over  the trunk and extremities, All with benign-appearing pigment network patterns on dermoscopy  Xerosis cutis bilateral lower extremity   Stucco keratosis bilateral lower extremity   Ill-defined erythematous gritty/scaly papule over the forehead, left lateral cheek , left temple       IMPRESSION / PLAN:      1. Actinic keratosis  CRYOTHERAPY:  Risks (including, but not limited to: skin discoloration, redness, blister, blood blister, recurrence, need for further treatment, infection, scar) and benefits of cryotherapy discussed. Patient verbally agreed to proceed with treatment. 1 cryotherapy freeze thaw cycles of 10 seconds were applied to 6 lesions on areas as noted on exam with cryac. Patient tolerated procedure well. Aftercare instructions given--no specific care needed unless irritated during healing process, can apply Vaseline with small band-aid if needed.      2. Lentigines  - Benign-appearing nature of lesions discussed during exam.   - Advised to continue to monitor for any return to clinic for new or concerning changes.      3. Multiple nevi  - Benign-appearing nature of lesions discussed during exam.   - Advised to continue to monitor for any return to clinic for new or concerning changes.  - ABCDE's of melanoma discussed/handout given      4. SK (seborrheic keratosis)  - Benign-appearing nature of lesions discussed during exam.   - Advised to continue to monitor for any return to clinic for new or concerning changes.      5. Cherry angioma  - Benign-appearing nature of lesions discussed during exam.   - Advised to continue to monitor for any return to clinic for new or concerning changes.      6. Xerosis cutis      7. Stucco keratoses  - Benign-appearing nature of lesions discussed during exam.   - Advised to continue to monitor for any return to clinic for new or concerning changes.      8. History of basal cell carcinoma (BCC)  Annual TSEs    9. Skin cancer screening  Skin cancer education  discussed  importance of sun protective clothing, eyewear in addition to the use of broad spectrum sunscreen with SPF 30 or greater, as well as need for reapplication ~every 2 hours when exposed to UVR/handout given  discussed importance following up for any new or changing lesions as noted in handout given, but every 12 months exams in clinic in the setting of dermatologic history  ABCDE's of melanoma discussed/handout given            Discussed risks associated with LN2, Patient verbalized understanding and agrees with plan regarding the above    I have performed a physical exam and reviewed and updated ROS and Plan today (8/29/2023). In review of dermatology visit (6/14/2022), there are no changes except as documented above.       Please note that this dictation was created using voice recognition software. I have made every reasonable attempt to correct obvious errors, but I expect that there are errors of grammar and possibly content that I did not discover before finalizing the note.      Return to clinic in: Return in about 1 year (around 8/29/2024) for RAJESH. and as needed for any new or changing skin lesions.

## 2023-11-29 ENCOUNTER — PATIENT MESSAGE (OUTPATIENT)
Dept: HEALTH INFORMATION MANAGEMENT | Facility: OTHER | Age: 78
End: 2023-11-29

## 2024-01-10 ENCOUNTER — HOSPITAL ENCOUNTER (INPATIENT)
Facility: MEDICAL CENTER | Age: 79
LOS: 1 days | DRG: 813 | End: 2024-01-12
Attending: EMERGENCY MEDICINE | Admitting: STUDENT IN AN ORGANIZED HEALTH CARE EDUCATION/TRAINING PROGRAM
Payer: COMMERCIAL

## 2024-01-10 ENCOUNTER — APPOINTMENT (OUTPATIENT)
Dept: RADIOLOGY | Facility: MEDICAL CENTER | Age: 79
DRG: 813 | End: 2024-01-10
Attending: EMERGENCY MEDICINE
Payer: COMMERCIAL

## 2024-01-10 DIAGNOSIS — R31.0 GROSS HEMATURIA: ICD-10-CM

## 2024-01-10 DIAGNOSIS — R31.9 HEMATURIA, UNSPECIFIED TYPE: ICD-10-CM

## 2024-01-10 DIAGNOSIS — D69.6 THROMBOCYTOPENIA (HCC): ICD-10-CM

## 2024-01-10 DIAGNOSIS — I48.91 ATRIAL FIBRILLATION, UNSPECIFIED TYPE (HCC): Primary | ICD-10-CM

## 2024-01-10 DIAGNOSIS — I48.19 PERSISTENT ATRIAL FIBRILLATION (HCC): ICD-10-CM

## 2024-01-10 LAB
ALBUMIN SERPL BCP-MCNC: 4.7 G/DL (ref 3.2–4.9)
ALBUMIN/GLOB SERPL: 1.7 G/DL
ALP SERPL-CCNC: 78 U/L (ref 30–99)
ALT SERPL-CCNC: 39 U/L (ref 2–50)
ANION GAP SERPL CALC-SCNC: 14 MMOL/L (ref 7–16)
APPEARANCE UR: ABNORMAL
AST SERPL-CCNC: 40 U/L (ref 12–45)
BACTERIA #/AREA URNS HPF: NEGATIVE /HPF
BILIRUB SERPL-MCNC: 0.4 MG/DL (ref 0.1–1.5)
BILIRUB UR QL STRIP.AUTO: ABNORMAL
BUN SERPL-MCNC: 23 MG/DL (ref 8–22)
CALCIUM ALBUM COR SERPL-MCNC: 9 MG/DL (ref 8.5–10.5)
CALCIUM SERPL-MCNC: 9.6 MG/DL (ref 8.5–10.5)
CHLORIDE SERPL-SCNC: 100 MMOL/L (ref 96–112)
CO2 SERPL-SCNC: 25 MMOL/L (ref 20–33)
COLOR UR: ABNORMAL
CREAT SERPL-MCNC: 0.92 MG/DL (ref 0.5–1.4)
EKG IMPRESSION: NORMAL
EPI CELLS #/AREA URNS HPF: NEGATIVE /HPF
GFR SERPLBLD CREATININE-BSD FMLA CKD-EPI: 86 ML/MIN/1.73 M 2
GLOBULIN SER CALC-MCNC: 2.8 G/DL (ref 1.9–3.5)
GLUCOSE SERPL-MCNC: 101 MG/DL (ref 65–99)
GLUCOSE UR STRIP.AUTO-MCNC: NEGATIVE MG/DL
HYALINE CASTS #/AREA URNS LPF: ABNORMAL /LPF
KETONES UR STRIP.AUTO-MCNC: 15 MG/DL
LEUKOCYTE ESTERASE UR QL STRIP.AUTO: ABNORMAL
MAGNESIUM SERPL-MCNC: 2.3 MG/DL (ref 1.5–2.5)
MICRO URNS: ABNORMAL
NITRITE UR QL STRIP.AUTO: NEGATIVE
PH UR STRIP.AUTO: 5.5 [PH] (ref 5–8)
PHOSPHATE SERPL-MCNC: 3.1 MG/DL (ref 2.5–4.5)
POTASSIUM SERPL-SCNC: 3.9 MMOL/L (ref 3.6–5.5)
PROT SERPL-MCNC: 7.5 G/DL (ref 6–8.2)
PROT UR QL STRIP: 100 MG/DL
RBC # URNS HPF: >150 /HPF
RBC UR QL AUTO: ABNORMAL
SODIUM SERPL-SCNC: 139 MMOL/L (ref 135–145)
SP GR UR STRIP.AUTO: 1.01
TROPONIN T SERPL-MCNC: 15 NG/L (ref 6–19)
UROBILINOGEN UR STRIP.AUTO-MCNC: 0.2 MG/DL
WBC #/AREA URNS HPF: ABNORMAL /HPF

## 2024-01-10 PROCEDURE — 71045 X-RAY EXAM CHEST 1 VIEW: CPT

## 2024-01-10 PROCEDURE — 84484 ASSAY OF TROPONIN QUANT: CPT

## 2024-01-10 PROCEDURE — 99285 EMERGENCY DEPT VISIT HI MDM: CPT

## 2024-01-10 PROCEDURE — 700117 HCHG RX CONTRAST REV CODE 255: Performed by: EMERGENCY MEDICINE

## 2024-01-10 PROCEDURE — 84153 ASSAY OF PSA TOTAL: CPT

## 2024-01-10 PROCEDURE — 85025 COMPLETE CBC W/AUTO DIFF WBC: CPT

## 2024-01-10 PROCEDURE — 93005 ELECTROCARDIOGRAM TRACING: CPT

## 2024-01-10 PROCEDURE — 93005 ELECTROCARDIOGRAM TRACING: CPT | Performed by: EMERGENCY MEDICINE

## 2024-01-10 PROCEDURE — 81001 URINALYSIS AUTO W/SCOPE: CPT

## 2024-01-10 PROCEDURE — 83735 ASSAY OF MAGNESIUM: CPT

## 2024-01-10 PROCEDURE — 84100 ASSAY OF PHOSPHORUS: CPT

## 2024-01-10 PROCEDURE — 85730 THROMBOPLASTIN TIME PARTIAL: CPT

## 2024-01-10 PROCEDURE — 74177 CT ABD & PELVIS W/CONTRAST: CPT

## 2024-01-10 PROCEDURE — 94760 N-INVAS EAR/PLS OXIMETRY 1: CPT

## 2024-01-10 PROCEDURE — 85055 RETICULATED PLATELET ASSAY: CPT

## 2024-01-10 PROCEDURE — 80053 COMPREHEN METABOLIC PANEL: CPT

## 2024-01-10 PROCEDURE — 85610 PROTHROMBIN TIME: CPT

## 2024-01-10 PROCEDURE — 36415 COLL VENOUS BLD VENIPUNCTURE: CPT

## 2024-01-10 RX ADMIN — IOHEXOL 100 ML: 350 INJECTION, SOLUTION INTRAVENOUS at 23:35

## 2024-01-11 ENCOUNTER — APPOINTMENT (OUTPATIENT)
Dept: CARDIOLOGY | Facility: MEDICAL CENTER | Age: 79
DRG: 813 | End: 2024-01-11
Attending: STUDENT IN AN ORGANIZED HEALTH CARE EDUCATION/TRAINING PROGRAM
Payer: COMMERCIAL

## 2024-01-11 PROBLEM — I16.0 HYPERTENSIVE URGENCY: Status: ACTIVE | Noted: 2024-01-11

## 2024-01-11 PROBLEM — N40.0 BPH (BENIGN PROSTATIC HYPERPLASIA): Status: ACTIVE | Noted: 2024-01-11

## 2024-01-11 PROBLEM — F32.9 MAJOR DEPRESSIVE DISORDER: Status: ACTIVE | Noted: 2024-01-11

## 2024-01-11 PROBLEM — R31.9 HEMATURIA: Status: ACTIVE | Noted: 2024-01-11

## 2024-01-11 PROBLEM — I48.91 AF (ATRIAL FIBRILLATION) (HCC): Status: ACTIVE | Noted: 2024-01-11

## 2024-01-11 PROBLEM — E78.5 DYSLIPIDEMIA: Status: ACTIVE | Noted: 2024-01-11

## 2024-01-11 PROBLEM — E03.9 HYPOTHYROIDISM: Status: ACTIVE | Noted: 2024-01-11

## 2024-01-11 PROBLEM — D69.6 THROMBOCYTOPENIA (HCC): Status: ACTIVE | Noted: 2024-01-11

## 2024-01-11 LAB
APTT PPP: 33.8 SEC (ref 24.7–36)
BASOPHILS # BLD AUTO: 0.6 % (ref 0–1.8)
BASOPHILS # BLD: 0.03 K/UL (ref 0–0.12)
EOSINOPHIL # BLD AUTO: 0.06 K/UL (ref 0–0.51)
EOSINOPHIL NFR BLD: 1.2 % (ref 0–6.9)
ERYTHROCYTE [DISTWIDTH] IN BLOOD BY AUTOMATED COUNT: 42.2 FL (ref 35.9–50)
HAV IGM SERPL QL IA: NORMAL
HBV CORE IGM SER QL: NORMAL
HBV SURFACE AG SER QL: NORMAL
HCT VFR BLD AUTO: 43.2 % (ref 42–52)
HCV AB SER QL: NORMAL
HGB BLD-MCNC: 15.1 G/DL (ref 14–18)
HIV 1+2 AB+HIV1 P24 AG SERPL QL IA: NORMAL
IMM GRANULOCYTES # BLD AUTO: 0.01 K/UL (ref 0–0.11)
IMM GRANULOCYTES NFR BLD AUTO: 0.2 % (ref 0–0.9)
INR PPP: 0.99 (ref 0.87–1.13)
LDH SERPL L TO P-CCNC: 223 U/L (ref 107–266)
LV EJECT FRACT  99904: 65
LV EJECT FRACT MOD 2C 99903: 63.67
LV EJECT FRACT MOD 4C 99902: 66.21
LV EJECT FRACT MOD BP 99901: 65.48
LYMPHOCYTES # BLD AUTO: 1.02 K/UL (ref 1–4.8)
LYMPHOCYTES NFR BLD: 20.8 % (ref 22–41)
MCH RBC QN AUTO: 31.2 PG (ref 27–33)
MCHC RBC AUTO-ENTMCNC: 35 G/DL (ref 32.3–36.5)
MCV RBC AUTO: 89.3 FL (ref 81.4–97.8)
MONOCYTES # BLD AUTO: 0.48 K/UL (ref 0–0.85)
MONOCYTES NFR BLD AUTO: 9.8 % (ref 0–13.4)
NEUTROPHILS # BLD AUTO: 3.31 K/UL (ref 1.82–7.42)
NEUTROPHILS NFR BLD: 67.4 % (ref 44–72)
NRBC # BLD AUTO: 0 K/UL
NRBC BLD-RTO: 0 /100 WBC (ref 0–0.2)
PLATELET # BLD AUTO: 174 K/UL (ref 164–446)
PLATELET # BLD AUTO: 54 K/UL (ref 164–446)
PLATELETS.RETICULATED NFR BLD AUTO: 32.3 % (ref 0.6–13.1)
PMV BLD AUTO: 12.8 FL (ref 9–12.9)
PROTHROMBIN TIME: 13.2 SEC (ref 12–14.6)
RBC # BLD AUTO: 4.84 M/UL (ref 4.7–6.1)
TSH SERPL DL<=0.005 MIU/L-ACNC: 3.29 UIU/ML (ref 0.38–5.33)
WBC # BLD AUTO: 4.9 K/UL (ref 4.8–10.8)

## 2024-01-11 PROCEDURE — 302140 GU CART

## 2024-01-11 PROCEDURE — 700102 HCHG RX REV CODE 250 W/ 637 OVERRIDE(OP)

## 2024-01-11 PROCEDURE — 85397 CLOTTING FUNCT ACTIVITY: CPT

## 2024-01-11 PROCEDURE — 99223 1ST HOSP IP/OBS HIGH 75: CPT | Mod: AI,GC | Performed by: STUDENT IN AN ORGANIZED HEALTH CARE EDUCATION/TRAINING PROGRAM

## 2024-01-11 PROCEDURE — 99285 EMERGENCY DEPT VISIT HI MDM: CPT

## 2024-01-11 PROCEDURE — 94760 N-INVAS EAR/PLS OXIMETRY 1: CPT

## 2024-01-11 PROCEDURE — 51702 INSERT TEMP BLADDER CATH: CPT

## 2024-01-11 PROCEDURE — 83615 LACTATE (LD) (LDH) ENZYME: CPT

## 2024-01-11 PROCEDURE — 700111 HCHG RX REV CODE 636 W/ 250 OVERRIDE (IP): Performed by: STUDENT IN AN ORGANIZED HEALTH CARE EDUCATION/TRAINING PROGRAM

## 2024-01-11 PROCEDURE — 36415 COLL VENOUS BLD VENIPUNCTURE: CPT

## 2024-01-11 PROCEDURE — 80074 ACUTE HEPATITIS PANEL: CPT

## 2024-01-11 PROCEDURE — 93306 TTE W/DOPPLER COMPLETE: CPT | Mod: 26 | Performed by: INTERNAL MEDICINE

## 2024-01-11 PROCEDURE — 93306 TTE W/DOPPLER COMPLETE: CPT

## 2024-01-11 PROCEDURE — 700102 HCHG RX REV CODE 250 W/ 637 OVERRIDE(OP): Performed by: STUDENT IN AN ORGANIZED HEALTH CARE EDUCATION/TRAINING PROGRAM

## 2024-01-11 PROCEDURE — 770020 HCHG ROOM/CARE - TELE (206)

## 2024-01-11 PROCEDURE — 96374 THER/PROPH/DIAG INJ IV PUSH: CPT

## 2024-01-11 PROCEDURE — 303105 HCHG CATHETER EXTRA

## 2024-01-11 PROCEDURE — A9270 NON-COVERED ITEM OR SERVICE: HCPCS | Performed by: STUDENT IN AN ORGANIZED HEALTH CARE EDUCATION/TRAINING PROGRAM

## 2024-01-11 PROCEDURE — G0475 HIV COMBINATION ASSAY: HCPCS

## 2024-01-11 PROCEDURE — 99221 1ST HOSP IP/OBS SF/LOW 40: CPT | Performed by: STUDENT IN AN ORGANIZED HEALTH CARE EDUCATION/TRAINING PROGRAM

## 2024-01-11 PROCEDURE — A9270 NON-COVERED ITEM OR SERVICE: HCPCS

## 2024-01-11 PROCEDURE — 84443 ASSAY THYROID STIM HORMONE: CPT

## 2024-01-11 RX ORDER — BISACODYL 10 MG
10 SUPPOSITORY, RECTAL RECTAL
Status: DISCONTINUED | OUTPATIENT
Start: 2024-01-11 | End: 2024-01-12 | Stop reason: HOSPADM

## 2024-01-11 RX ORDER — LEVOTHYROXINE SODIUM 0.12 MG/1
125 TABLET ORAL
Status: DISCONTINUED | OUTPATIENT
Start: 2024-01-11 | End: 2024-01-12 | Stop reason: HOSPADM

## 2024-01-11 RX ORDER — AMLODIPINE BESYLATE 5 MG/1
10 TABLET ORAL DAILY
Status: DISCONTINUED | OUTPATIENT
Start: 2024-01-11 | End: 2024-01-12 | Stop reason: HOSPADM

## 2024-01-11 RX ORDER — LABETALOL HYDROCHLORIDE 5 MG/ML
10 INJECTION, SOLUTION INTRAVENOUS EVERY 6 HOURS PRN
Status: DISCONTINUED | OUTPATIENT
Start: 2024-01-11 | End: 2024-01-12 | Stop reason: HOSPADM

## 2024-01-11 RX ORDER — ATORVASTATIN CALCIUM 20 MG/1
20 TABLET, FILM COATED ORAL EVERY EVENING
Status: DISCONTINUED | OUTPATIENT
Start: 2024-01-11 | End: 2024-01-12 | Stop reason: HOSPADM

## 2024-01-11 RX ORDER — POLYETHYLENE GLYCOL 3350 17 G/17G
1 POWDER, FOR SOLUTION ORAL
Status: DISCONTINUED | OUTPATIENT
Start: 2024-01-11 | End: 2024-01-12 | Stop reason: HOSPADM

## 2024-01-11 RX ORDER — MIRTAZAPINE 30 MG/1
45 TABLET, FILM COATED ORAL NIGHTLY
Status: DISCONTINUED | OUTPATIENT
Start: 2024-01-11 | End: 2024-01-12 | Stop reason: HOSPADM

## 2024-01-11 RX ORDER — ACETAMINOPHEN 325 MG/1
650 TABLET ORAL EVERY 6 HOURS PRN
Status: DISCONTINUED | OUTPATIENT
Start: 2024-01-11 | End: 2024-01-12 | Stop reason: HOSPADM

## 2024-01-11 RX ORDER — METOPROLOL SUCCINATE 25 MG/1
100 TABLET, EXTENDED RELEASE ORAL DAILY
Status: DISCONTINUED | OUTPATIENT
Start: 2024-01-11 | End: 2024-01-12 | Stop reason: HOSPADM

## 2024-01-11 RX ORDER — LABETALOL HYDROCHLORIDE 5 MG/ML
10 INJECTION, SOLUTION INTRAVENOUS ONCE
Status: COMPLETED | OUTPATIENT
Start: 2024-01-11 | End: 2024-01-11

## 2024-01-11 RX ORDER — AMOXICILLIN 250 MG
2 CAPSULE ORAL 2 TIMES DAILY
Status: DISCONTINUED | OUTPATIENT
Start: 2024-01-11 | End: 2024-01-12 | Stop reason: HOSPADM

## 2024-01-11 RX ORDER — LISINOPRIL 20 MG/1
40 TABLET ORAL DAILY
Status: DISCONTINUED | OUTPATIENT
Start: 2024-01-11 | End: 2024-01-12 | Stop reason: HOSPADM

## 2024-01-11 RX ADMIN — LISINOPRIL 40 MG: 20 TABLET ORAL at 16:27

## 2024-01-11 RX ADMIN — LEVOTHYROXINE SODIUM 125 MCG: 0.12 TABLET ORAL at 06:00

## 2024-01-11 RX ADMIN — METOPROLOL SUCCINATE 100 MG: 50 TABLET, EXTENDED RELEASE ORAL at 06:00

## 2024-01-11 RX ADMIN — AMLODIPINE BESYLATE 10 MG: 5 TABLET ORAL at 05:58

## 2024-01-11 RX ADMIN — ATORVASTATIN CALCIUM 20 MG: 20 TABLET, FILM COATED ORAL at 18:41

## 2024-01-11 RX ADMIN — MIRTAZAPINE 45 MG: 30 TABLET, FILM COATED ORAL at 22:18

## 2024-01-11 RX ADMIN — LABETALOL HYDROCHLORIDE 10 MG: 5 INJECTION, SOLUTION INTRAVENOUS at 01:55

## 2024-01-11 ASSESSMENT — ENCOUNTER SYMPTOMS
VOMITING: 0
ABDOMINAL PAIN: 0
DIZZINESS: 0
PALPITATIONS: 0
CHILLS: 0
DOUBLE VISION: 0
SHORTNESS OF BREATH: 0
NAUSEA: 0
DIARRHEA: 0
WEIGHT LOSS: 0
FEVER: 0
CONSTIPATION: 0
HEMOPTYSIS: 0
BLOOD IN STOOL: 0
COUGH: 0

## 2024-01-11 ASSESSMENT — FIBROSIS 4 INDEX: FIB4 SCORE: 9.01

## 2024-01-11 ASSESSMENT — LIFESTYLE VARIABLES
EVER FELT BAD OR GUILTY ABOUT YOUR DRINKING: NO
TOTAL SCORE: 0
TOTAL SCORE: 0
CONSUMPTION TOTAL: NEGATIVE
HAVE PEOPLE ANNOYED YOU BY CRITICIZING YOUR DRINKING: NO
EVER HAD A DRINK FIRST THING IN THE MORNING TO STEADY YOUR NERVES TO GET RID OF A HANGOVER: NO
TOTAL SCORE: 0
SUBSTANCE_ABUSE: 0
AVERAGE NUMBER OF DAYS PER WEEK YOU HAVE A DRINK CONTAINING ALCOHOL: 0
HAVE YOU EVER FELT YOU SHOULD CUT DOWN ON YOUR DRINKING: NO
DOES PATIENT WANT TO STOP DRINKING: NO
HOW MANY TIMES IN THE PAST YEAR HAVE YOU HAD 5 OR MORE DRINKS IN A DAY: 0
ALCOHOL_USE: NO
ON A TYPICAL DAY WHEN YOU DRINK ALCOHOL HOW MANY DRINKS DO YOU HAVE: 0

## 2024-01-11 ASSESSMENT — COGNITIVE AND FUNCTIONAL STATUS - GENERAL
SUGGESTED CMS G CODE MODIFIER MOBILITY: CH
MOBILITY SCORE: 24
SUGGESTED CMS G CODE MODIFIER DAILY ACTIVITY: CH
DAILY ACTIVITIY SCORE: 24

## 2024-01-11 ASSESSMENT — PAIN DESCRIPTION - PAIN TYPE: TYPE: ACUTE PAIN

## 2024-01-11 NOTE — PROGRESS NOTES
"Hospital Medicine Daily Progress Note    Date of Service  1/11/2024    Chief Complaint  Júnior Notron is a 78 y.o. male admitted 1/10/2024 with hematuria    Hospital Course  Patient is a 78 y.o.  male w/ a PMHx of hypertension, hyperlipidemia, hypothyroidism, who presented to the ED on 1/10/2024 for hematuria.     Patient states he had hematuria today at 8 p.m. For about a week, was taking Aleve BID. Denies any other bleeding or bruising. No prior history hematuria or clotting disorder. Had TURP procedure 10 years ago in [place], no current urologist. At baseline does have incomplete bladder emptying, nocturia.      In the ED, hypertensive SBP 200s, HR 80, 92% on RA, plt 54, INR/PTT WNL, UA with large occult blood, small LE, negative nitrite, negative bacteria, CXR with bilateral basilar atelectasis, CT abdomen/pelvis with showing diverticulosis, EKG, A-fib, QTc 463. He was admitted for observation of hematuria with plan for urology consult in AM as well as further workup of his thrombocytopenia.     Interval Problem Update  1/11 - On my exam, he states he overall feels fairly well. No pain, in no acute distress. Nursing noted to have 250cc of \"red wine\" colored urine with clots, bladder scan revealed an estimated post-void residual of 400cc. Discussed with Dr Jeffery, urology. Will start bladder irrigation now and have him stay NPO this morning pending urology evaluation.       I have discussed this patient's plan of care and discharge plan at IDT rounds today with Case Management, Nursing, Nursing leadership, and other members of the IDT team.    Consultants/Specialty  urology    Code Status  Full Code    Disposition  The patient is not medically cleared for discharge to home or a post-acute facility.      I have placed the appropriate orders for post-discharge needs.    Review of Systems  Review of Systems   Constitutional:  Negative for chills, fever and weight loss.   HENT:  Negative for " tinnitus.    Eyes:  Negative for double vision.   Respiratory:  Negative for cough, hemoptysis and shortness of breath.    Cardiovascular:  Negative for chest pain and palpitations.   Gastrointestinal:  Negative for abdominal pain, blood in stool, constipation, diarrhea, melena, nausea and vomiting.   Genitourinary:  Positive for hematuria. Negative for dysuria and frequency.   Neurological:  Negative for dizziness.   Psychiatric/Behavioral:  Negative for substance abuse.         Physical Exam  Temp:  [36.6 °C (97.8 °F)-36.7 °C (98.1 °F)] 36.6 °C (97.9 °F)  Pulse:  [70-92] 70  Resp:  [16-38] 20  BP: (123-194)/() 156/96  SpO2:  [90 %-96 %] 90 %    Physical Exam  Vitals and nursing note reviewed.   Constitutional:       Appearance: Normal appearance.   HENT:      Head: Normocephalic and atraumatic.      Nose: Nose normal.      Mouth/Throat:      Mouth: Mucous membranes are moist.   Eyes:      Extraocular Movements: Extraocular movements intact.      Conjunctiva/sclera: Conjunctivae normal.   Cardiovascular:      Rate and Rhythm: Normal rate and regular rhythm.      Pulses: Normal pulses.      Heart sounds: Normal heart sounds.   Pulmonary:      Effort: Pulmonary effort is normal.      Breath sounds: Normal breath sounds.   Abdominal:      General: There is no distension.      Palpations: Abdomen is soft.      Tenderness: There is no abdominal tenderness.   Musculoskeletal:         General: Normal range of motion.      Cervical back: Normal range of motion.   Skin:     General: Skin is warm.      Capillary Refill: Capillary refill takes less than 2 seconds.      Findings: No bruising, lesion or rash.   Neurological:      General: No focal deficit present.      Mental Status: He is alert and oriented to person, place, and time.   Psychiatric:         Mood and Affect: Mood normal.         Behavior: Behavior normal.         Fluids    Intake/Output Summary (Last 24 hours) at 1/11/2024 9264  Last data filed at  1/11/2024 1625  Gross per 24 hour   Intake --   Output -450 ml   Net 450 ml       Laboratory  Recent Labs     01/10/24  2342   WBC 4.9   RBC 4.84   HEMOGLOBIN 15.1   HEMATOCRIT 43.2   MCV 89.3   MCH 31.2   MCHC 35.0   RDW 42.2   PLATELETCT 54*   MPV 12.8     Recent Labs     01/10/24  2236   SODIUM 139   POTASSIUM 3.9   CHLORIDE 100   CO2 25   GLUCOSE 101*   BUN 23*   CREATININE 0.92   CALCIUM 9.6     Recent Labs     01/10/24  2236   APTT 33.8   INR 0.99               Imaging  EC-ECHOCARDIOGRAM COMPLETE W/O CONT   Final Result      CT-ABDOMEN-PELVIS WITH   Final Result         1.  Diverticulosis   2.  Atherosclerosis   3.  Fat-containing umbilical hernia   4.  Fat-containing bilateral inguinal hernias      DX-CHEST-PORTABLE (1 VIEW)   Final Result         1.  Bilateral basilar atelectasis, no focal infiltrate           Assessment/Plan  * Hematuria  Assessment & Plan  *Without RICHARD  *Likely due to thrombocytopenia    -Management as above for thrombocytopenia  Urology consulted, Dr. Jeffery  CBI  NPO pending formal urology eval and recs    Major depressive disorder  Assessment & Plan  *On mirtazapine since 2004    -Continue home Mirtazapine 45 mg nightly    Hypothyroidism  Assessment & Plan  *No recent TSH on file    -Continue home Levothyroxine 125 mcg    Dyslipidemia  Assessment & Plan    -Continue home Atorvastatin 20 mg night    Hypertensive urgency  Assessment & Plan  * on admission's  *No signs of endorgan damage    -Continue home antihypertensive amlodipine 10 mg, metoprolol succinate 100 mg, lisinopril 40 mg  -Continue to monitor    AF (atrial fibrillation) (HCC)  Assessment & Plan  *New onset, [id number] 3  *EKG, A-fib, QTc 463  *Rate controlled  TSH WNL  Echo LVEF 65%, no significant valvular abnormality    -Telemetry monitoring  -Hold on anticoagulation due to thrombocytopenia    BPH (benign prostatic hyperplasia)  Assessment & Plan  *History of TURP 10 years ago now with chronic LUTS    Urology  consulted for hematuria as above    Thrombocytopenia (HCC)- (present on admission)  Assessment & Plan  *Platelet 54 with citrate, complicated by hematuria, has been taking Aleve 2X daily this week for chronic back pain  *Most likely drug-induced thrombocytopenia from Aleve, less likely HIT with no recent hospital admission, no alcohol use, no recent sickness to suspect hemolytic uremic syndrome  HCV/HIV negative. INR wnl    -Telemetry monitoring  -Continue monitor for signs of bleeding, transfuse for goal platelet >50 if continued bleeding, otherwise continue monitor with repeat CBC  [id number] pending       VTE prophylaxis:   SCDs/TEDs    I have performed a physical exam and reviewed and updated ROS and Plan today (1/11/2024). In review of yesterday's note (1/10/2024), there are no changes except as documented above.

## 2024-01-11 NOTE — ED NOTES
Bedside report received from off going RN Jennie, assumed care of patient.  POC discussed with patient. Call light within reach, all needs addressed at this time.       Fall risk interventions in place: Move the patient closer to the nurse's station and Keep floor surfaces clean and dry (all applicable per [place] Fall risk assessment)   Continuous monitoring: Pulse Ox or Blood Pressure  IVF/IV medications: Not Applicable   Oxygen: Room Air  Bedside sitter: Not Applicable   Isolation: Not Applicable

## 2024-01-11 NOTE — ED NOTES
Pharmacy Medication Reconciliation      ~Medication reconciliation updated and complete per patient at bedside.   ~Allergies have been verified and updated   ~No oral ABX within the last 30 days  ~[facility] :  [facility]      ~Anticoagulants (rivaroxaban, apixaban, edoxaban, dabigatran, warfarin, enoxaparin) taken in the last 14 days? NO  ~Anticoagulant: NA Last dose: NA

## 2024-01-11 NOTE — ED NOTES
Bladder kgxm=130 cc post void residual.   Informed Lacho mcgraw, ordered 3way cath with cont irrigation.

## 2024-01-11 NOTE — CONSULTS
Surgery Urology Consultation    Date of Service  1/11/2024    Referring Physician  Lacho Betancur A.P.R*    Consulting Physician  Bela Jeffery M.D.    Reason for Consultation  Gross Hematuria    History of Presenting Illness  78 y.o. male who presented 1/10/2024 with gross hematuria that started last night around 8 PM. He states he did experience some dysuria. He has baseline urgency/frequency particularly bad at night, up 3-4x. He had a TURP 10 years ago, has not had any other procedures since. No other episodes of gross hematuria. Tried oxybutynin, had severe constipation. CT AP negative, 90g prostate estimated.    Review of Systems  Review of Systems   All other systems reviewed and are negative.      Past Medical History   has a past medical history of Depression, Hyperlipemia, Hypertension, and Hypothyroid.    Surgical History   has a past surgical history that includes submandible abscess incision and drainage (Left, 9/15/2018) and dental extraction(s) (Left, 9/15/2018).    Family History  family history is not on file.    Social History   reports that he has never smoked. He has never used smokeless tobacco. He reports that he does not currently use alcohol. He reports that he does not use drugs.    Medications      Allergies  No Known Allergies    Physical Exam  Temp:  [35.9 °C (96.7 °F)-36.7 °C (98.1 °F)] 36.6 °C (97.8 °F)  Pulse:  [70-92] 75  Resp:  [16-23] 20  BP: (123-201)/() 158/93  SpO2:  [90 %-95 %] 95 %    Physical Exam  HENT:      Head: Normocephalic and atraumatic.   Eyes:      Extraocular Movements: Extraocular movements intact.   Pulmonary:      Effort: Pulmonary effort is normal.   Abdominal:      General: Abdomen is flat.   Genitourinary:     Comments: Clear urine on slow rate of CBI        Fluids      Laboratory  Recent Labs     01/10/24  2342   WBC 4.9   RBC 4.84   HEMOGLOBIN 15.1   HEMATOCRIT 43.2   MCV 89.3   MCH 31.2   MCHC 35.0   RDW 42.2   PLATELETCT 54*   MPV 12.8     Recent  Labs     01/10/24  2236   SODIUM 139   POTASSIUM 3.9   CHLORIDE 100   CO2 25   GLUCOSE 101*   BUN 23*   CREATININE 0.92   CALCIUM 9.6     Recent Labs     01/10/24  2236   APTT 33.8   INR 0.99                 Imaging  CT AP: negative for any significant findings, 90g prostate    Assessment/Plan  Okay for diet  Continue nguyen with CBI, can slowly titrate rate down to off  If hematuria resolves with CBI off can consider void trial tomorrow  No acute surgical intervention

## 2024-01-11 NOTE — ED PROVIDER NOTES
ED Provider Note    Scribed for Vernon Cee by Alexandro Lozada. 1/10/2024  10:06 PM    Primary care provider: Pcp Pt States None  Means of arrival: Walk-in  History obtained from: Patient  History limited by: None    CHIEF COMPLAINT  Chief Complaint   Patient presents with    Blood in Urine     Pt states he went to the bathroom and urinated blood second time it was more diluted. Pt c/o burning with urination, denies taking blood thinners. Pt has hx of HTN.      EXTERNAL RECORDS REVIEWED  Outpatient Notes patient follows up closely with dermatology and was last seen in their office in August of last year for actinic keratosis    HPI/ROS  LIMITATION TO HISTORY   Select: : None  OUTSIDE HISTORIAN(S):  Significant other Patient's wife present at bedside.     HPI  Júnior Norton is a 78 y.o. male who presents to the Emergency Department for evaluation of hematuria onset approximately 9 PM. The patient states he was getting ready to go to bed, and he went to use the restroom and had bright red blood in his urine. The patient reports associated dysuria.The patient states he went to use the restroom again, and there was still blood in the urine. The patient reports middle lower back pain 10 days ago that radiated into his right hip. Patient notes this pain has resolved at this time. The patient denies a history of UTI, nausea, vomiting, increased frequency of urination, blood thinner use, fevers, or kidney stones. The wife reports the patient takes the following medications: Synthroid, Amlodipine, Metoprolol, Lisinopril, and a statin.The patient denies a history of smoking, alcohol, or drug use. The patient has no known allergies.     REVIEW OF SYSTEMS  As above, all other systems reviewed and are negative.   See HPI for further details.     PAST MEDICAL HISTORY   has a past medical history of Depression, Hyperlipemia, Hypertension, and Hypothyroid.  SURGICAL HISTORY   has a past surgical history that includes  "submandible abscess incision and drainage (Left, 9/15/2018) and dental extraction(s) (Left, 9/15/2018).  SOCIAL HISTORY  Social History     Tobacco Use    Smoking status: Never    Smokeless tobacco: Never   Vaping Use    Vaping Use: Never used   Substance Use Topics    Alcohol use: Not Currently    Drug use: Never      Social History     Substance and Sexual Activity   Drug Use Never     FAMILY HISTORY  History reviewed. No pertinent family history.  CURRENT MEDICATIONS  Home Medications    **Home medications have not yet been reviewed for this encounter**       ALLERGIES  No Known Allergies    PHYSICAL EXAM    VITAL SIGNS:   Vitals:    01/10/24 2131 01/10/24 2209 01/10/24 2259 01/10/24 2301   BP:  (!) 179/100  (!) 151/92   Pulse:  92 84 74   Resp:       Temp:       TempSrc:       SpO2:  92% 90%    Weight: 106 kg (233 lb 4 oz)      Height: 1.753 m (5' 9\")        Vitals: My interpretation: hypertensive, not tachycardic, afebrile, not hypoxic    Reinterpretation of vitals: Unchanged although slightly improved blood pressure on reevaluation    Cardiac Monitor Interpretation: The cardiac monitor revealed normal Sinus Rhythm as interpreted by me. The cardiac monitor was ordered secondary to the patient's history of atrial fibrillation and to monitor for dysrhythmia and/or tachycardia.    PE:   Gen: sitting comfortably, speaking clearly, appears in no acute distress   ENT: Mucous membranes moist, posterior pharynx clear, uvula midline, nares patent bilaterally   Neck: Supple, FROM  Pulmonary: Lungs are clear to auscultation bilaterally. No tachypnea  CV: Irregularly irregular, no murmur appreciated, pulses 2+ in both upper and lower extremities  Abdomen: soft, NT/ND; no rebound/guarding  : no CVA or suprapubic tenderness   Neuro: A&Ox4 (person, place, time, situation), speech fluent, gait steady, no focal deficits appreciated  Skin: No rash or lesions.  No pallor or jaundice.  No cyanosis.  Warm and dry.     DIAGNOSTIC " STUDIES / PROCEDURES    LABS  Results for orders placed or performed during the hospital encounter of 01/10/24   URINALYSIS CULTURE, IF INDICATED    Specimen: Urine, Clean Catch   Result Value Ref Range    Color Red (A)     Character Cloudy (A)     Specific Gravity 1.011 <1.035    Ph 5.5 5.0 - 8.0    Glucose Negative Negative mg/dL    Ketones 15 (A) Negative mg/dL    Protein 100 (A) Negative mg/dL    Bilirubin Moderate (A) Negative    Urobilinogen, Urine 0.2 Negative    Nitrite Negative Negative    Leukocyte Esterase Small (A) Negative    Occult Blood Large (A) Negative    Micro Urine Req Microscopic    URINE MICROSCOPIC (W/UA)   Result Value Ref Range    WBC 2-5 (A) /hpf    RBC >150 (A) /hpf    Bacteria Negative None /hpf    Epithelial Cells Negative /hpf    Hyaline Cast 0-2 /lpf   COMP METABOLIC PANEL   Result Value Ref Range    Sodium 139 135 - 145 mmol/L    Potassium 3.9 3.6 - 5.5 mmol/L    Chloride 100 96 - 112 mmol/L    Co2 25 20 - 33 mmol/L    Anion Gap 14.0 7.0 - 16.0    Glucose 101 (H) 65 - 99 mg/dL    Bun 23 (H) 8 - 22 mg/dL    Creatinine 0.92 0.50 - 1.40 mg/dL    Calcium 9.6 8.5 - 10.5 mg/dL    Correct Calcium 9.0 8.5 - 10.5 mg/dL    AST(SGOT) 40 12 - 45 U/L    ALT(SGPT) 39 2 - 50 U/L    Alkaline Phosphatase 78 30 - 99 U/L    Total Bilirubin 0.4 0.1 - 1.5 mg/dL    Albumin 4.7 3.2 - 4.9 g/dL    Total Protein 7.5 6.0 - 8.2 g/dL    Globulin 2.8 1.9 - 3.5 g/dL    A-G Ratio 1.7 g/dL   MAGNESIUM   Result Value Ref Range    Magnesium 2.3 1.5 - 2.5 mg/dL   PHOSPHORUS   Result Value Ref Range    Phosphorus 3.1 2.5 - 4.5 mg/dL   TROPONIN   Result Value Ref Range    Troponin T 15 6 - 19 ng/L   ESTIMATED GFR   Result Value Ref Range    GFR (CKD-EPI) 86 >60 mL/min/1.73 m 2   CBC WITH DIFFERENTIAL   Result Value Ref Range    WBC 4.9 4.8 - 10.8 K/uL    RBC 4.84 4.70 - 6.10 M/uL    Hemoglobin 15.1 14.0 - 18.0 g/dL    Hematocrit 43.2 42.0 - 52.0 %    MCV 89.3 81.4 - 97.8 fL    MCH 31.2 27.0 - 33.0 pg    MCHC 35.0 32.3 -  36.5 g/dL    RDW 42.2 35.9 - 50.0 fL    Platelet Count 54 (L) 164 - 446 K/uL    MPV 12.8 9.0 - 12.9 fL    Neutrophils-Polys 67.40 44.00 - 72.00 %    Lymphocytes 20.80 (L) 22.00 - 41.00 %    Monocytes 9.80 0.00 - 13.40 %    Eosinophils 1.20 0.00 - 6.90 %    Basophils 0.60 0.00 - 1.80 %    Immature Granulocytes 0.20 0.00 - 0.90 %    Nucleated RBC 0.00 0.00 - 0.20 /100 WBC    Neutrophils (Absolute) 3.31 1.82 - 7.42 K/uL    Lymphs (Absolute) 1.02 1.00 - 4.80 K/uL    Monos (Absolute) 0.48 0.00 - 0.85 K/uL    Eos (Absolute) 0.06 0.00 - 0.51 K/uL    Baso (Absolute) 0.03 0.00 - 0.12 K/uL    Immature Granulocytes (abs) 0.01 0.00 - 0.11 K/uL    NRBC (Absolute) 0.00 K/uL   PLATELET COUNT, CITRATED   Result Value Ref Range    Platelet Count, Citrated 174 164 - 446 K/uL   IMMATURE PLT FRACTION   Result Value Ref Range    Imm. Plt Fraction 32.3 (H) 0.6 - 13.1 %   EKG (NOW)   Result Value Ref Range    Report       [facility]    Test Date:  2024-01-10  Pt Name:    RANDELL AU                Department: ER  MRN:        [id number]                      Room:  Gender:     Male                         Technician: [id number]  :        1945                   Requested By:ER TRIAGE PROTOCOL  Order #:    [id number]                    Reading MD: Vernon Cee    Measurements  Intervals                                Axis  Rate:       77                           P:          0  MI:         0                            QRS:        13  QRSD:       101                          T:          87  QT:         409  QTc:        463    Interpretive Statements  Atrial fibrillation  Borderline low voltage, extremity leads  Nonspecific T abnormalities, lateral leads  No previous ECG available for comparison  Electronically Signed On 01- 22:06:55 PST by Vernon Cee        All labs reviewed by me. Labs were compared to prior labs if they were available. Significant for urinalysis shows large blood but  no signs of infection, electrolytes are normal, normal glucose, normal renal function, normal liver enzymes, normal bilirubin, magnesium and phosphorus are normal, troponin negative, no leukocytosis, no anemia, severe thrombocytopenia at 54,000.    RADIOLOGY  I have independently interpreted the diagnostic imaging associated with this visit and am waiting the final reading from the radiologist.   My preliminary interpretation is a follows: No focal consolidations or cardiomegaly on my independent interpretation  Radiologist interpretation is as follows:  CT-ABDOMEN-PELVIS WITH   Final Result         1.  Diverticulosis   2.  Atherosclerosis   3.  Fat-containing umbilical hernia   4.  Fat-containing bilateral inguinal hernias      DX-CHEST-PORTABLE (1 VIEW)   Final Result         1.  Bilateral basilar atelectasis, no focal infiltrate        COURSE & MEDICAL DECISION MAKING  Nursing notes, VS, PMSFHx, labs, imaging, EKG reviewed in chart.    Heart Score: Low    ED Observation Status? No; Patient does not meet criteria for ED Observation.     Ddx: Bladder malignancy, BPH, urinary retention, UTI, kidney stone    MDM: 10:06 PM Júnior Norton is a 78 y.o. male who presented with evaluation of 3 episodes of mildly painful hematuria that started this evening.  Bright red blood per urination.  No prior history of anything similar, no history of UTIs.  Denies flank or back pain currently.  No fevers.  Arrives hypertensive otherwise unremarkable vital signs.  Wife at bedside helps provide a lot of the collateral formation regarding his presentation here today.  Patient states that he feels like he does not have to urinate any more frequently and usually, usually has to urinate 3-4 times a night, this has not changed.  He has a history remotely of a TURP procedure 10 years ago which did help with his BPH symptoms however, it only helped for a year or 2 and then he resumed typical urinary retention symptoms.  He states  that he always has incomplete bladder emptying and there is no recent changes.  Denies any medication changes.  He is not on blood thinners.  Upon arrival here he was noted to have an irregular heart rate in triage and EKG was done showing atrial fibrillation.  Patient states that the only time he had A-fib before was he when he had sepsis secondary to UTI after the TURP procedure but it resolved and he do not have to take any medications for it.  He is on metoprolol however for blood pressure medication.  Considering new onset A-fib will initiate workup with chest x-ray, laboratory evaluation.  Urinalysis sent.  Urinalysis does show large blood, however it is negative for bacteria.  I did a bedside ultrasound of the bladder which showed no significant blood clots in the bladder or bladder wall thickening at this time.  Will initiate CT imaging for better characterization of the bladder and patient's new onset painful hematuria to rule out kidney stone or other possible malignancy related issues. All labs reviewed by me. Labs were compared to prior labs if they were available. Significant for urinalysis shows large blood but no signs of infection, electrolytes are normal, normal glucose, normal renal function, normal liver enzymes, normal bilirubin, magnesium and phosphorus are normal, troponin negative, no leukocytosis, no anemia, severe thrombocytopenia at 54,000.  CT imaging negative for any concerns regarding the bladder.  At this time as patient is severely thrombocytopenic, noted to have new onset A-fib, as well as gross hematuria, I have multiple concerns that he will have to have decision-making conversation with specialist regarding the risk and benefits of anticoagulation in the setting of thrombocytopenia and gross hematuria.  I recommend at this time inpatient admission for consultation for cystoscopy with urology regarding his gross hematuria, consideration of of cardiology consultation for new onset  A-fib and discussion of anticoagulation and consideration of consultation with hematology regarding his new onset thrombocytopenia with gross hematuria.  Discussed all of this with patient and his wife and they are amenable to admission.  Discussed with hospitalist team and they are amenable.    ADDITIONAL PROBLEM LIST AND DISPOSITION    I have discussed management of the patient with the following physicians and ROGER's:  Hospitalist team    Discussion of management with other QHP or appropriate source(s): None     Barriers to care at this time, including but not limited to: Patient does not have established PCP.     Decision tools and prescription drugs considered including, but not limited to: HEART Score low .    FINAL IMPRESSION  1. Atrial fibrillation, unspecified type (HCC) Acute   2. Hematuria, unspecified type Acute   3. Thrombocytopenia (HCC) Acute      IAlexandro (Concepcion), am scribing for, and in the presence of, Vernon Cee.    Electronically signed by: Alexandro Lozada (Concepcion), 1/10/2024    IVernon personally performed the services described in this documentation, as scribed by Alexandro Lozada in my presence, and it is both accurate and complete.    The note accurately reflects work and decisions made by me.  Vernon Cee  1/10/2024  10:50 PM

## 2024-01-11 NOTE — ASSESSMENT & PLAN NOTE
*Without RICHARD  *Likely due to thrombocytopenia    -Management as above for thrombocytopenia  Urology consulted, Dr. Jeffery  CBI  NPO pending formal urology eval and recs

## 2024-01-11 NOTE — ED NOTES
Patient moved to a floor bed for comfort.      22F 3-way catheter placed for CBI.  Sterile technique.  Patient tolerated well.  + urine return.  Urine brownish in color and relatively clear, flushed w/ a small amount of NS to completely clear.  Admitting notified.  Awaiting urology.  Patient updated.      Bedside report given to Ale BUI.

## 2024-01-11 NOTE — ED NOTES
Bedside report received from Tawana BUI.  Patient resting on gurney, gurney in lowest position, side rails up.  Patient on continuous monitor, O2 2L via NC.  Patient A&O.  Denies pain.  Afib on the monitor at a rate 60-70.  Patient denies hx of afib.  Patient voiding per urinal.  Francisco blood noted, onset last night.  Patient has admit orders, awaiting a bed assignment.  Patient updated.

## 2024-01-11 NOTE — ED NOTES
Bedside report received from Sujit BUI, assumed care of patient. Pt on hospital bed. BP, cardiac and pulse ox in place, pt currently on 2L NC connected to wall oxygen. Pt medium fall risk, standard precautions in place and using call light appropriately. CBI in place.

## 2024-01-11 NOTE — H&P
[facility] Internal Medicine History & Physical Note    Date of Service  1/11/2024    [facility] Team: SONY   Attending: Dr. Arrington  Senior Resident: Dr. Cosby    Contact Number: [phone]    Primary Care Physician  Pcp Pt States None    Consultants  NA    Specialist Names: NA    Code Status  Full Code    Chief Complaint  Chief Complaint   Patient presents with    Blood in Urine     Pt states he went to the bathroom and urinated blood second time it was more diluted. Pt c/o burning with urination, denies taking blood thinners. Pt has hx of HTN.        History of Presenting Illness (HPI):     Patient is a 78 y.o. [place] male w/ a PMHx of hypertension, hyperlipidemia, hypothyroidism, who presented to the ED on 1/10/2024 for hematuria.    Patient states he had hematuria today at 8 p.m. For about a week, was taking Alleve twice in the morning. Stopped taking Aspirin >5 years ago per Cardiology. Denies any nose bleeds, easy bruisability, petechiae, hematochezia, melena, hematemesis, neurological changes. Denies any family history of clotting disorders. Had TURP procedure 10 years ago. Slight dysuria, otherwise no dysuria. Does have incomplete bladder emptying, nocturia. Denies any recent sickness, sick contacts, or recent travel. No new drugs.    BP usually 120-170s, most recent BP 130s last week. Denies chest pain, SOB, blurry vision, headaches. On 3 antihypertensives Lisinopril 40 mg, Metoprolol Succinate 100mg, Amlodipine 10 mg. Took all meds today. Past few months did have SOB. No increase salt intake. On ketogenic diet for the past 10 days.    Denies any smoking, quit 45 years ago, alcohol use, recreational drug use.     In the ED, hypertensive SBP 200s, HR 80, 92% on RA, plt 54, INR/PTT WNL, UA with large occult blood, small LE, negative nitrite, negative bacteria, CXR with bilateral basilar atelectasis, CT abdomen/pelvis with showing diverticulosis, EKG, A-fib, QTc 463      I discussed the plan of care with patient,  family, bedside RN, charge RN, , and pharmacy.    Review of Systems  Review of Systems   Constitutional:  Negative for chills, fever and weight loss.   HENT:  Negative for tinnitus.    Eyes:  Negative for double vision.   Respiratory:  Negative for cough, hemoptysis and shortness of breath.    Cardiovascular:  Negative for chest pain and palpitations.   Gastrointestinal:  Negative for abdominal pain, blood in stool, constipation, diarrhea, melena, nausea and vomiting.   Genitourinary:  Positive for hematuria. Negative for dysuria and frequency.   Neurological:  Negative for dizziness.   Psychiatric/Behavioral:  Negative for substance abuse.        Past Medical History   has a past medical history of Depression, Hyperlipemia, Hypertension, and Hypothyroid.    Surgical History   has a past surgical history that includes submandible abscess incision and drainage (Left, 9/15/2018) and dental extraction(s) (Left, 9/15/2018).     Family History  family history is not on file.   Family history reviewed with patient.     Social History  Tobacco: Please see HPI  Alcohol: Please see HPI  Recreational drugs (illegal or prescription): Please see HPI  Employment: Please see HPI  Living Situation: Please see HPI  Recent Travel: Please see HPI  Primary Care Provider: Reviewed    Allergies  No Known Allergies    Medications  Prior to Admission Medications   Prescriptions Last Dose Informant Patient Reported? Taking?   B Complex Vitamins (VITAMIN B COMPLEX PO)  Patient Yes No   Sig: Take 1 Tab by mouth every day.   Calcium Carbonate (CALCIUM 600 PO)  Patient Yes No   Sig: Take 1 Tab by mouth every day.   Vitamin E 400 UNIT Tab  Patient Yes No   Sig: Take 800 Units by mouth every day.   amLODIPine (NORVASC) 10 MG Tab  Patient Yes No   Sig: Take 10 mg by mouth every day.   amoxicillin-clavulanate (AUGMENTIN) 875-125 MG Tab  Patient Yes No   Sig: Take 1 Tab by mouth 2 times a day. 10 DAYS   aspirin EC (ECOTRIN) 81 MG Tablet  Delayed Response  Patient Yes No   Sig: Take 81 mg by mouth every 48 hours.   atorvastatin (LIPITOR) 80 MG tablet  Patient Yes No   Sig: Take 80 mg by mouth every evening.   levothyroxine (SYNTHROID) 150 MCG Tab  Patient Yes No   Sig: Take 150 mcg by mouth Every morning on an empty stomach.   lisinopril (PRINIVIL, ZESTRIL) 40 MG tablet  Patient Yes No   Sig: Take 40 mg by mouth every day.   magnesium oxide (MAG-OX) 400 MG Tab  Patient Yes No   Sig: Take 400 mg by mouth every day.   metoprolol SR (TOPROL XL) 100 MG TABLET SR 24 HR  Patient Yes No   Sig: Take 100 mg by mouth every day.   mirtazapine (REMERON) 45 MG tablet  Patient Yes No   Sig: Take 45 mg by mouth every evening.   vitamin D (CHOLECALCIFEROL) 1000 UNIT Tab  Patient Yes No   Sig: Take 1,000 Units by mouth every day.      Facility-Administered Medications: None       Physical Exam  Temp:  [35.9 °C (96.7 °F)-36.7 °C (98 °F)] 36.7 °C (98 °F)  Pulse:  [74-92] 79  Resp:  [16-18] 18  BP: (143-201)/() 143/82  SpO2:  [90 %-93 %] 93 %  Blood Pressure : (!) 151/92   Temperature: 35.9 °C (96.7 °F)   Pulse: 74   Respiration: 16   Pulse Oximetry: 90 %       Physical Exam  Vitals and nursing note reviewed.   Constitutional:       Appearance: Normal appearance.   HENT:      Head: Normocephalic and atraumatic.      Right Ear: External ear normal.      Left Ear: External ear normal.      Nose: Nose normal.      Mouth/Throat:      Mouth: Mucous membranes are moist.      Pharynx: Oropharynx is clear.   Eyes:      Extraocular Movements: Extraocular movements intact.      Pupils: Pupils are equal, round, and reactive to light.   Cardiovascular:      Rate and Rhythm: Normal rate and regular rhythm.      Pulses: Normal pulses.      Heart sounds: Normal heart sounds.   Pulmonary:      Effort: Pulmonary effort is normal.      Breath sounds: Normal breath sounds.   Abdominal:      General: There is no distension.      Palpations: Abdomen is soft.      Tenderness: There is  "no abdominal tenderness. There is no guarding or rebound.   Musculoskeletal:         General: Normal range of motion.      Cervical back: Normal range of motion.   Skin:     General: Skin is warm.      Capillary Refill: Capillary refill takes less than 2 seconds.      Comments: No petechiae or significant ecchymosis noted   Neurological:      General: No focal deficit present.      Mental Status: He is alert and oriented to person, place, and time.   Psychiatric:         Mood and Affect: Mood normal.         Behavior: Behavior normal.         Thought Content: Thought content normal.         Judgment: Judgment normal.         Laboratory:  Recent Labs     01/10/24  2342   WBC 4.9   RBC 4.84   HEMOGLOBIN 15.1   HEMATOCRIT 43.2   MCV 89.3   MCH 31.2   MCHC 35.0   RDW 42.2   PLATELETCT 54*   MPV 12.8     Recent Labs     01/10/24  2236   SODIUM 139   POTASSIUM 3.9   CHLORIDE 100   CO2 25   GLUCOSE 101*   BUN 23*   CREATININE 0.92   CALCIUM 9.6     Recent Labs     01/10/24  2236   ALTSGPT 39   ASTSGOT 40   ALKPHOSPHAT 78   TBILIRUBIN 0.4   GLUCOSE 101*     Recent Labs     01/10/24  2236   APTT 33.8   INR 0.99     No results for input(s): \"NTPROBNP\" in the last 72 hours.      Recent Labs     01/10/24  2236   TROPONINT 15       Imaging:  CT-ABDOMEN-PELVIS WITH   Final Result         1.  Diverticulosis   2.  Atherosclerosis   3.  Fat-containing umbilical hernia   4.  Fat-containing bilateral inguinal hernias      DX-CHEST-PORTABLE (1 VIEW)   Final Result         1.  Bilateral basilar atelectasis, no focal infiltrate      EC-ECHOCARDIOGRAM COMPLETE W/O CONT    (Results Pending)       X-Ray:  I have personally reviewed the images and compared with prior images.  EKG:  I have personally reviewed the images and compared with prior images.    Assessment/Plan:  Problem Representation:   I anticipate this patient will require at least two midnights for appropriate medical management, necessitating inpatient admission because of " management of thrombocytopenia    Patient will need a Telemetry bed on EMERGENCY service .  The need is secondary to management of thrombocytopenia.    * Thrombocytopenia (HCC)- (present on admission)  Assessment & Plan  *Platelet 54 with citrate, complicated by hematuria, has been taking Aleve 2X daily this week for chronic back pain  *Most likely drug-induced thrombocytopenia from Aleve, less likely HIT with no recent hospital admission, no alcohol use, no recent sickness to suspect hemolytic uremic syndrome    -Telemetry monitoring  -Ordered INR/PTT  -HCV/HIV ordered to rule out occult infectious disease  -Kyaw TS 13 ordered to rule out life-threatening thrombotic thrombocytopenic purpura  -Continue monitor for signs of bleeding, transfuse for goal platelet >50 if continued bleeding, otherwise continue monitor with repeat CBC  -Most likely due drug-induced thrombocytopenia from naproxen, educated to trial acetaminophen    Hematuria  Assessment & Plan  *Without RICHARD  *Likely due to thrombocytopenia    -Management as above for thrombocytopenia    Hypertensive urgency  Assessment & Plan  * on admission's  *No signs of endorgan damage    -Labetalol 10 mg IVP X1  -Continue home antihypertensive amlodipine 10 mg, metoprolol succinate 100 mg, lisinopril 40 mg  -Continue to monitor    AF (atrial fibrillation) (HCC)  Assessment & Plan  *New onset, [id number] 3  *EKG, A-fib, QTc 463  *Rate controlled    -Telemetry monitoring  -TSH ordered  -Echo to rule out structural defects  -Hold on anticoagulation due to thrombocytopenia    Major depressive disorder  Assessment & Plan  *On mirtazapine since 2004    -Continue home Mirtazapine 45 mg nightly    Hypothyroidism  Assessment & Plan  *No recent TSH on file    -Continue home Levothyroxine 125 mcg    Dyslipidemia  Assessment & Plan    -Continue home Atorvastatin 20 mg night    BPH (benign prostatic hyperplasia)  Assessment & Plan  *History of TURP 10 years ago now with  chronic LUTS    -Ordered PSA  -Follow outpatient, consider restarting tamsulosin        VTE prophylaxis: SCDs/TEDs

## 2024-01-11 NOTE — PROGRESS NOTES
Ambulated patient to bathroom sba. Patient with steady gait. /99, hr 80 - still in a fib. Asymptomatic. Lacho Betancur notified. - restarted home lisinopril.

## 2024-01-11 NOTE — ED TRIAGE NOTES
Chief Complaint   Patient presents with    Blood in Urine     Pt states he went to the bathroom and urinated blood second time it was more diluted. Pt c/o burning with urination, denies taking blood thinners. Pt has hx of HTN.      Pt ambulatory to triage for above complaint. Pt denies abdominal pain.      Pt is alert/oriented and follows commands. Pt speaking in full sentences and responds appropriately to questions. No acute distress noted in triage and respirations are even and unlabored.     Pt placed in lobby and educated on triage process. Pt encouraged to alert staff for any changes in condition.

## 2024-01-11 NOTE — ED NOTES
Pt went to bathroom ambulated independently. Pt informed and witnessed passing blood urine in toilet.   AMD made aware thru voalte.

## 2024-01-11 NOTE — ASSESSMENT & PLAN NOTE
* on admission's  *No signs of endorgan damage    -Continue home antihypertensive amlodipine 10 mg, metoprolol succinate 100 mg, lisinopril 40 mg  -Continue to monitor

## 2024-01-11 NOTE — ASSESSMENT & PLAN NOTE
*Platelet 54 with citrate, complicated by hematuria, has been taking Aleve 2X daily this week for chronic back pain  *Most likely drug-induced thrombocytopenia from Aleve, less likely HIT with no recent hospital admission, no alcohol use, no recent sickness to suspect hemolytic uremic syndrome  HCV/HIV negative. INR wnl    -Telemetry monitoring  -Continue monitor for signs of bleeding, transfuse for goal platelet >50 if continued bleeding, otherwise continue monitor with repeat CBC  [id number] pending

## 2024-01-11 NOTE — ASSESSMENT & PLAN NOTE
*New onset, [id number] 3  *EKG, A-fib, QTc 463  *Rate controlled  TSH WNL  Echo LVEF 65%, no significant valvular abnormality    -Telemetry monitoring  -Hold on anticoagulation due to thrombocytopenia

## 2024-01-12 VITALS
TEMPERATURE: 98.4 F | RESPIRATION RATE: 18 BRPM | OXYGEN SATURATION: 91 % | WEIGHT: 225.53 LBS | HEIGHT: 69 IN | HEART RATE: 70 BPM | DIASTOLIC BLOOD PRESSURE: 89 MMHG | SYSTOLIC BLOOD PRESSURE: 146 MMHG | BODY MASS INDEX: 33.4 KG/M2

## 2024-01-12 DIAGNOSIS — D69.6 THROMBOCYTOPENIA (HCC): ICD-10-CM

## 2024-01-12 LAB
ALBUMIN SERPL BCP-MCNC: 4.2 G/DL (ref 3.2–4.9)
ALBUMIN/GLOB SERPL: 1.6 G/DL
ALP SERPL-CCNC: 67 U/L (ref 30–99)
ALT SERPL-CCNC: 30 U/L (ref 2–50)
ANION GAP SERPL CALC-SCNC: 17 MMOL/L (ref 7–16)
AST SERPL-CCNC: 26 U/L (ref 12–45)
BASOPHILS # BLD AUTO: 0.3 % (ref 0–1.8)
BASOPHILS # BLD: 0.02 K/UL (ref 0–0.12)
BILIRUB SERPL-MCNC: 0.6 MG/DL (ref 0.1–1.5)
BUN SERPL-MCNC: 14 MG/DL (ref 8–22)
CALCIUM ALBUM COR SERPL-MCNC: 8.6 MG/DL (ref 8.5–10.5)
CALCIUM SERPL-MCNC: 8.8 MG/DL (ref 8.5–10.5)
CHLORIDE SERPL-SCNC: 103 MMOL/L (ref 96–112)
CO2 SERPL-SCNC: 22 MMOL/L (ref 20–33)
CREAT SERPL-MCNC: 0.86 MG/DL (ref 0.5–1.4)
EOSINOPHIL # BLD AUTO: 0.06 K/UL (ref 0–0.51)
EOSINOPHIL NFR BLD: 0.9 % (ref 0–6.9)
ERYTHROCYTE [DISTWIDTH] IN BLOOD BY AUTOMATED COUNT: 43 FL (ref 35.9–50)
GFR SERPLBLD CREATININE-BSD FMLA CKD-EPI: 90 ML/MIN/1.73 M 2
GLOBULIN SER CALC-MCNC: 2.7 G/DL (ref 1.9–3.5)
GLUCOSE SERPL-MCNC: 76 MG/DL (ref 65–99)
HCT VFR BLD AUTO: 46.6 % (ref 42–52)
HGB BLD-MCNC: 16.2 G/DL (ref 14–18)
IMM GRANULOCYTES # BLD AUTO: 0.01 K/UL (ref 0–0.11)
IMM GRANULOCYTES NFR BLD AUTO: 0.2 % (ref 0–0.9)
LYMPHOCYTES # BLD AUTO: 0.92 K/UL (ref 1–4.8)
LYMPHOCYTES NFR BLD: 14.5 % (ref 22–41)
MCH RBC QN AUTO: 31.5 PG (ref 27–33)
MCHC RBC AUTO-ENTMCNC: 34.8 G/DL (ref 32.3–36.5)
MCV RBC AUTO: 90.7 FL (ref 81.4–97.8)
MONOCYTES # BLD AUTO: 0.62 K/UL (ref 0–0.85)
MONOCYTES NFR BLD AUTO: 9.8 % (ref 0–13.4)
NEUTROPHILS # BLD AUTO: 4.72 K/UL (ref 1.82–7.42)
NEUTROPHILS NFR BLD: 74.3 % (ref 44–72)
NRBC # BLD AUTO: 0 K/UL
NRBC BLD-RTO: 0 /100 WBC (ref 0–0.2)
PLATELET # BLD AUTO: 52 K/UL (ref 164–446)
PLATELETS.RETICULATED NFR BLD AUTO: 24.3 % (ref 0.6–13.1)
PMV BLD AUTO: 12.4 FL (ref 9–12.9)
POTASSIUM SERPL-SCNC: 3.8 MMOL/L (ref 3.6–5.5)
PROT SERPL-MCNC: 6.9 G/DL (ref 6–8.2)
PSA FREE MFR SERPL: 19 %
PSA FREE SERPL-MCNC: 1.7 NG/ML
PSA SERPL-MCNC: 8.9 NG/ML (ref 0–4)
RBC # BLD AUTO: 5.14 M/UL (ref 4.7–6.1)
SODIUM SERPL-SCNC: 142 MMOL/L (ref 135–145)
WBC # BLD AUTO: 6.4 K/UL (ref 4.8–10.8)

## 2024-01-12 PROCEDURE — 85055 RETICULATED PLATELET ASSAY: CPT

## 2024-01-12 PROCEDURE — 700102 HCHG RX REV CODE 250 W/ 637 OVERRIDE(OP): Performed by: STUDENT IN AN ORGANIZED HEALTH CARE EDUCATION/TRAINING PROGRAM

## 2024-01-12 PROCEDURE — 99239 HOSP IP/OBS DSCHRG MGMT >30: CPT | Performed by: STUDENT IN AN ORGANIZED HEALTH CARE EDUCATION/TRAINING PROGRAM

## 2024-01-12 PROCEDURE — 84154 ASSAY OF PSA FREE: CPT

## 2024-01-12 PROCEDURE — 36415 COLL VENOUS BLD VENIPUNCTURE: CPT

## 2024-01-12 PROCEDURE — 85025 COMPLETE CBC W/AUTO DIFF WBC: CPT

## 2024-01-12 PROCEDURE — 96374 THER/PROPH/DIAG INJ IV PUSH: CPT

## 2024-01-12 PROCEDURE — 700102 HCHG RX REV CODE 250 W/ 637 OVERRIDE(OP)

## 2024-01-12 PROCEDURE — A9270 NON-COVERED ITEM OR SERVICE: HCPCS | Performed by: STUDENT IN AN ORGANIZED HEALTH CARE EDUCATION/TRAINING PROGRAM

## 2024-01-12 PROCEDURE — 51798 US URINE CAPACITY MEASURE: CPT

## 2024-01-12 PROCEDURE — A9270 NON-COVERED ITEM OR SERVICE: HCPCS

## 2024-01-12 PROCEDURE — 700111 HCHG RX REV CODE 636 W/ 250 OVERRIDE (IP)

## 2024-01-12 PROCEDURE — 99222 1ST HOSP IP/OBS MODERATE 55: CPT | Mod: GC | Performed by: INTERNAL MEDICINE

## 2024-01-12 PROCEDURE — 51702 INSERT TEMP BLADDER CATH: CPT

## 2024-01-12 PROCEDURE — 80053 COMPREHEN METABOLIC PANEL: CPT

## 2024-01-12 PROCEDURE — 303105 HCHG CATHETER EXTRA

## 2024-01-12 RX ADMIN — METOPROLOL SUCCINATE 100 MG: 50 TABLET, EXTENDED RELEASE ORAL at 04:05

## 2024-01-12 RX ADMIN — LISINOPRIL 40 MG: 20 TABLET ORAL at 04:05

## 2024-01-12 RX ADMIN — LEVOTHYROXINE SODIUM 125 MCG: 0.12 TABLET ORAL at 04:05

## 2024-01-12 RX ADMIN — AMLODIPINE BESYLATE 10 MG: 5 TABLET ORAL at 04:05

## 2024-01-12 RX ADMIN — LABETALOL HYDROCHLORIDE 10 MG: 5 INJECTION, SOLUTION INTRAVENOUS at 00:36

## 2024-01-12 ASSESSMENT — FIBROSIS 4 INDEX: FIB4 SCORE: 6.94

## 2024-01-12 ASSESSMENT — PATIENT HEALTH QUESTIONNAIRE - PHQ9
SUM OF ALL RESPONSES TO PHQ9 QUESTIONS 1 AND 2: 0
1. LITTLE INTEREST OR PLEASURE IN DOING THINGS: NOT AT ALL
2. FEELING DOWN, DEPRESSED, IRRITABLE, OR HOPELESS: NOT AT ALL

## 2024-01-12 ASSESSMENT — PAIN DESCRIPTION - PAIN TYPE: TYPE: ACUTE PAIN

## 2024-01-12 NOTE — PROGRESS NOTES
Explained procedure to the patient. Removed IFC with bladder irrigation tubing. Deflated baloon with 15ml NS. Pulled catheter with no resistance and pain verbalized.

## 2024-01-12 NOTE — CARE PLAN
The patient is Stable - Low risk of patient condition declining or worsening    Shift Goals  Clinical Goals: Safety, void without difficulty  Patient Goals: sleep and discharge  Family Goals: ESTEBAN      Problem: Knowledge Deficit - Standard  Goal: Patient and family/care givers will demonstrate understanding of plan of care, disease process/condition, diagnostic tests and medications  Outcome: Progressing    Notes: Updated patient on POC. Patient verbalized understanging.      Problem: Mobility  Goal: Patient's capacity to carry out activities will improve  Outcome: Progressing     Notes: Patient was able to ambulate to the bathroom on steady gait. Patient more independent and able to do ADL's independently. Attended to needs. Call light within reach.

## 2024-01-12 NOTE — PROGRESS NOTES
IV dc'd.  Discharge instructions given to patient; patient verbalizes understanding, all questions answered.  Copy of DC summary provided, signed copy in chart.   prescriptions electronically sent to pt's pharmacy/provided to patient, copies in chart.  Pt states personal belongings are in possession.  Pt escorted off unit by [facility] without incident.

## 2024-01-12 NOTE — DISCHARGE INSTRUCTIONS
You were admitted for evaluation of bloody urine.  Please follow-up with urology once discharged.  Please stop taking Aleve.  During her hospital stay, you are found to have abnormal heart rhythm.  Please follow-up with cardiology in 1 week to evaluation for atrial fibrillation (abnormal heart rhythm).  Prior to your clinic visit with cardiology, please get your lab drawn.  Return to the ED for changes in balance, unilateral weakness, slurred speech, chest pain, urinary retention, increased amount of blood in the urine.

## 2024-01-12 NOTE — PROGRESS NOTES
4 Eyes Skin Assessment Completed by RAMYA Branch and RAMYA Carrera.    Head WDL  Ears WDL  Nose WDL  Mouth WDL  Neck WDL  Breast/Chest WDL  Shoulder Blades WDL  Spine WDL  (R) Arm/Elbow/Hand WDL  (L) Arm/Elbow/Hand WDL  Abdomen WDL  Groin WDL  Scrotum/Coccyx/Buttocks WDL  (R) Leg Dry/flaky  (L) Leg  dry/flaky  (R) Heel/Foot/Toe WDL  (L) Heel/Foot/Toe WDL          Devices In Places Tele Box, Pulse Ox, and Nasal Cannula      Interventions In Place Gray Ear Foams and Pillows    Possible Skin Injury No    Pictures Uploaded Into Epic N/A  Wound Consult Placed N/A  RN Wound Prevention Protocol Ordered No

## 2024-01-12 NOTE — CARE PLAN
Problem: Hemodynamics  Goal: Patient's hemodynamics, fluid balance and neurologic status will be stable or improve  Description: Target End Date:  Prior to discharge or change in level of care    Document on Assessment and I/O flowsheet templates    1.  Monitor vital signs, pulse oximetry and cardiac monitor per provider order and/or policy  2.  Maintain blood pressure per provider order  3.  Hemodynamic monitoring per provider order  4.  Manage IV fluids and IV infusions  5.  Monitor intake and output  6.  Daily weights per unit policy or provider order  7.  Assess peripheral pulses and capillary refill  8.  Assess color and body temperature  9.  Position patient for maximum circulation/cardiac output  10. Monitor for signs/symptoms of excessive bleeding  11. Assess mental status, restlessness and changes in level of consciousness  12. Monitor temperature and report fever or hypothermia to provider immediately. Consideration of targeted temperature management.  Outcome: Progressing     Problem: Knowledge Deficit - Atrial Fibrillation  Goal: Patient and family/care givers will demonstrate understanding of atrial fibrillation condition and follow-up  Description: Target End Date:  1-3 days or as soon as patient condition allows    1.  Instruct immediate reporting of any chest discomfort or pain  2.  Assess for appropriateness of cardiac rehab referral  Outcome: Progressing   The patient is Stable - Low risk of patient condition declining or worsening    Shift Goals  Clinical Goals: Safety, CBI, monitor labs/VS  Patient Goals: sleep and discharge  Family Goals: morris    Progress made toward(s) clinical / shift goals:  Pt has demonstrated stable hemodynamics by the EOS.

## 2024-01-12 NOTE — HOSPITAL COURSE
Patient is a 78 y.o.  male w/ a PMHx of hypertension, hyperlipidemia, hypothyroidism, who presented to the ED on 1/10/2024 for hematuria.     Patient states he had hematuria today at 8 p.m. For about a week, was taking Aleve BID. Denies any other bleeding or bruising. No prior history hematuria or clotting disorder. Had TURP procedure 10 years ago in [place], no current urologist. At baseline does have incomplete bladder emptying, nocturia.      In the ED, hypertensive SBP 200s, HR 80, 92% on RA, plt 54, INR/PTT WNL, UA with large occult blood, small LE, negative nitrite, negative bacteria, CXR with bilateral basilar atelectasis, CT abdomen/pelvis with showing diverticulosis, EKG, A-fib, QTc 463. He was admitted for observation of hematuria with plan for urology consult in AM as well as further workup of his thrombocytopenia.

## 2024-01-12 NOTE — PROGRESS NOTES
Tele Summary    Rhythm: Afib  Rate: 60-80  Ectopy: PVC  -/.10/-    Per tele monitor strip

## 2024-01-12 NOTE — CONSULTS
CARDIOLOGY CONSULTATION NOTE      Date of Consultation: 1/12/2024  Consulting Provider: Suman Sandra D.O.    Patient Name: Júnior Norton  YOB: 1945  MRN: [id number]    Reason for Consultation:   Atrial Fibrillation     History of Present Illness:   Júnior Norton is a 78 year-old male with a past medical history of hypertension, hyperlipidemia, hypothyroidism .  Patient initially presented on 1-10 due to complaints of new onset hematuria with associated dysuria, found to also have new onset of thrombocytopenia with a count of 54.  Patient was admitted for thrombocytopenia and hematuria.  Patient had a EKG done in the ED which demonstrated atrial fibrillation with a rate of 77.  Echocardiogram performed on 1-11 demonstrated an ejection fraction of 65%.  [id number]-HAQt of 3. Patient denies any cardiac history. States he wishes to establish with a cardiologist outpatient.    Patient denies any chest pain, shortness of breath, hematuria has resolved. Denies any previous history of bleeds.  Medical History:     Past Medical History:   Diagnosis Date    Depression     Hyperlipemia     Hypertension     Hypothyroid        Surgical History:     Past Surgical History:   Procedure Laterality Date    SUBMANDIBLE ABSCESS INCISION AND DRAINAGE Left 9/15/2018    Procedure: SUBMANDIBLE ABSCESS INCISION AND DRAINAGE;  Surgeon: Adarsh Gee M.D.;  Location: SURGERY [facility];  Service: Dental    DENTAL EXTRACTION(S) Left 9/15/2018    Procedure: DENTAL EXTRACTION(S) #18;  Surgeon: Adarsh Gee M.D.;  Location: [facility];  Service: Dental       Family History:   History reviewed. No pertinent family history.    Social History:   The patient is a ex smoker, denies alcohol use, denies drug use.    Medications and Allergies:     Current Facility-Administered Medications   Medication Dose Route Frequency Provider Last Rate Last Admin    senna-docusate (Pericolace Or  "Senokot S) 8.6-50 MG per tablet 2 Tablet  2 Tablet Oral BID Jagjit Cosby D.O.        And    polyethylene glycol/lytes (Miralax) Packet 1 Packet  1 Packet Oral QDAY PRN Jagjit Cosby D.O.        And    magnesium hydroxide (Milk Of Magnesia) suspension 30 mL  30 mL Oral QDAY PRN Jagjit Cosby D.O.        And    bisacodyl (Dulcolax) suppository 10 mg  10 mg Rectal QDAY PRN Jagjit Cosby D.O.        acetaminophen (Tylenol) tablet 650 mg  650 mg Oral Q6HRS PRN Jagjit Cosby D.O.        amLODIPine (Norvasc) tablet 10 mg  10 mg Oral DAILY Jagjit Cosby D.O.   10 mg at 01/12/24 0405    atorvastatin (Lipitor) tablet 20 mg  20 mg Oral Q EVENING Jagjit Cosby D.O.   20 mg at 01/11/24 1841    levothyroxine (Synthroid) tablet 125 mcg  125 mcg Oral AM ES Jagjit Cosby D.O.   125 mcg at 01/12/24 0405    mirtazapine (Remeron) tablet 45 mg  45 mg Oral Nightly Jagjit Cosby D.O.   45 mg at 01/11/24 2218    metoprolol SR (Toprol XL) tablet 100 mg  100 mg Oral DAILY Jagjit Cosby D.O.   100 mg at 01/12/24 0405    lisinopril (Prinivil) tablet 40 mg  40 mg Oral DAILY Lacho Betancur, A.P.R.N.   40 mg at 01/12/24 0405    labetalol (Normodyne/Trandate) injection 10 mg  10 mg Intravenous Q6HRS PRN Sherri Grey, A.P.R.N.   10 mg at 01/12/24 0036       No Known Allergies    Review of Systems:   A pertinent review of systems was performed and was unremarkable except as per HPI above.    Vital Signs:   BP (!) 154/96 Comment: RN NOTIFIED  Pulse 63   Temp 36.7 °C (98.1 °F) (Temporal)   Resp 20   Ht 1.753 m (5' 9\")   Wt 102 kg (225 lb 8.5 oz)   SpO2 94%   BMI 33.31 kg/m²   Vitals:    01/12/24 0058 01/12/24 0400 01/12/24 0632 01/12/24 0715   BP: 139/78 (!) 181/101 (!) 148/82 (!) 154/96   Pulse: 80 77  63   Resp: 18 18  20   Temp:  36.5 °C (97.7 °F)  36.7 °C (98.1 °F)   TempSrc:  Temporal  Temporal   SpO2: 94% 95%  94%   Weight:  102 kg (225 lb 8.5 oz)     Height:         Body mass index is 33.31 kg/m².  Oxygen Therapy:  Pulse Oximetry: 94 %, " O2 (LPM): 1, O2 Delivery Device: Nasal Cannula    Physical Examination:   General: Well-appearing, no acute distress  Eyes: Extraocular movements intact, anicteric  HENT: No jugular venous distension  Pulmonary: Normal respiratory effort, clear to auscultation bilaterally  Cardiovascular: irregular rate and rhythm, no murmurs or gallops appreciated  Gastrointestinal: Soft, non-tender, non-distended  Extremities: Warm and well perfused, no lower extremity edema  Neurological: Alert and oriented, no gross focal motor deficits  Psychiatric: Normal affect    Laboratories:   Estimated Creatinine Clearance: 86 mL/min (by C-G formula based on SCr of 0.86 mg/dL).  Recent Labs     01/10/24  2236 01/12/24  0157   CREATININE 0.92 0.86   BUN 23* 14   POTASSIUM 3.9 3.8   SODIUM 139 142   CALCIUM 9.6 8.8   MAGNESIUM 2.3  --    CO2 25 22   ALBUMIN 4.7 4.2     Recent Labs     01/10/24  2236 01/12/24  0157   GLUCOSE 101* 76     Recent Labs     01/10/24  2236 01/12/24  0157   ASTSGOT 40 26   ALTSGPT 39 30   ALKPHOSPHAT 78 67   INR 0.99  --      Recent Labs     01/10/24  2342 01/12/24  0157   WBC 4.9 6.4   HEMOGLOBIN 15.1 16.2   PLATELETCT 54* 52*     Recent Labs     01/10/24  2236   TROPONINT 15       Studies:   Echocardiography  1/11/2024 TTE  CONCLUSIONS  No prior study is available for comparison.   Normal left ventricular systolic function.  The ejection fraction is measured to be 65 % by Porras's biplane.  Diastolic function is difficult to assess with arrhythmia.  Normal right ventricular systolic function.  No significant valvular abnormalities.      Electrophysiology  EKG performed on 1-10 demonstrate atrial fibrillation with a rate of 77    Assessment and Recommendations:   #Atrial fibrillation  #Thrombocytopenia  Appears to be new onset atrial fibrillation,during hospital patient's rate appears to be 60-80.  Echocardiogram performed in the hospital appears to have normal left ventricular systolic function with a EF of 65%,  no significant valvular abnormalities.  Patient also appears to have what is new onset thrombocytopenia recent count of 52.  [id number] of at least 3.  -Discussed with patient about risk of stroke with his atrial fibrillation and his risk of a major bleed if he is started on a anticoagulant at this point.  -As this patient's chief complaint was hematuria and continue to have thrombocytopenia recommended patient to hold off on anticoagulation for now, repeat a CBC in 1 week and follow-up with cardiology outpatient and at that point if his platelet count  has increased , hemoglobin remains stable he can be started on oral anticoagulation at that time. Patient is in agreement with this plan  -Patient was given the option of low-dose Eliquis until he is seen in clinic as there is still a risk of having a stroke, however he wishes to hold off at this time due to his increased risk of bleed.  -Will discuss at outpatient follow-up if he is a good candidate for ablation versus cardioversion  -Continue Metoprolol SR 100mg Daily    Dariusz Engle MD  PGY-2 IM Resident  [facility]

## 2024-01-12 NOTE — DISCHARGE SUMMARY
[facility] Discharge Summary    Attending: Mumtaz Kline M.d.  Senior Resident: Dr. Vasquez  Intern:  Dr. Sandra  Contact Number: [phone]    CHIEF COMPLAINT ON ADMISSION  Chief Complaint   Patient presents with    Blood in Urine     Pt states he went to the bathroom and urinated blood second time it was more diluted. Pt c/o burning with urination, denies taking blood thinners. Pt has hx of HTN.        Reason for Admission  Blood in Urine     Admission Date  1/10/2024    CODE STATUS  Full Code    HPI & HOSPITAL COURSE    Patient is a 78 y.o.  male w/ a PMHx of hypertension, hyperlipidemia, hypothyroidism, who presented to the ED on 1/10/2024 with complains of few hours of painless hematuria. For about a week, he had been taking Aleve BID for his back. Denied any other bleeding or bruising. No prior history hematuria or clotting disorder. Had TURP procedure 10 years ago in [place], no current urologist. At baseline does have incomplete bladder emptying, nocturia.      In the ED, he was found to be hypertensive SBP 200s, HR 80, 92% on RA, labs with thrombocytopenia of 54, INR/PTT normal, UA with large occult blood, small LE, negative nitrite, negative bacteria, CXR with bilateral basilar atelectasis, CT abdomen/pelvis with diverticulosis, EKG, A-fib, QTc 463. He was admitted for observation of hematuria, new onset thrombocytopenia, and new onset aFib.     Urology was consulted, nguyen with continuous bladder irrigation was initiated and successful, after resolution of hematuria, voiding trial was successful as well. Thrombocytopenia can be drug-induced from Aleve, as no other causes are evident (no liver disease, no other drugs, no other cell lines or coag labs affected, los risk for HIT), has been stable but will need outpatient hematology evaluation if persists. Cardiology was involved in case for new onset aFib (no RVR) CHADSVASC 3 (indication for anticoagulation), they recommended not to  initiate anticoagulation for 1 week until repeating CBC and cardiology f/u, therefore, no cardioversion planned for now, but to be determined in the outpatient setting. Patient remained stable and asymptomatic.     Therefore, he is discharged in good and stable condition to home with close outpatient follow-up.    The patient met 2-midnight criteria for an inpatient stay at the time of discharge.    Discharge Date  1/12/24    Physical Exam on Day of Discharge  Physical Exam  Vitals reviewed.   Constitutional:       General: He is not in acute distress.     Appearance: Normal appearance. He is normal weight. He is not ill-appearing.   HENT:      Head: Normocephalic and atraumatic.      Nose: Nose normal.      Mouth/Throat:      Mouth: Mucous membranes are moist.      Pharynx: Oropharynx is clear.   Eyes:      Extraocular Movements: Extraocular movements intact.      Conjunctiva/sclera: Conjunctivae normal.      Pupils: Pupils are equal, round, and reactive to light.   Cardiovascular:      Rate and Rhythm: Normal rate. Rhythm irregular.      Pulses: Normal pulses.      Heart sounds: Normal heart sounds.   Pulmonary:      Effort: Pulmonary effort is normal.      Breath sounds: Normal breath sounds.   Abdominal:      General: Abdomen is flat. Bowel sounds are normal. There is no distension.      Palpations: Abdomen is soft. There is no mass.      Tenderness: There is no abdominal tenderness.   Genitourinary:     Penis: Normal.       Comments: No hematuria/clear urine  Musculoskeletal:         General: Normal range of motion.      Cervical back: Normal range of motion and neck supple.   Skin:     General: Skin is warm and dry.      Capillary Refill: Capillary refill takes less than 2 seconds.   Neurological:      General: No focal deficit present.      Mental Status: He is alert and oriented to person, place, and time. Mental status is at baseline.   Psychiatric:         Mood and Affect: Mood normal.         FOLLOW UP  ITEMS POST DISCHARGE  Thrombocytopenia  Episode of hematuria with chronic prostate issues   New onset aFib     DISCHARGE DIAGNOSES  Principal Problem:    Hematuria (POA: Unknown)  Active Problems:    Thrombocytopenia (HCC) (POA: Yes)    BPH (benign prostatic hyperplasia) (POA: Unknown)    AF (atrial fibrillation) (HCC) (POA: Unknown)    Hypertensive urgency (POA: Unknown)    Dyslipidemia (POA: Unknown)    Hypothyroidism (POA: Unknown)    Major depressive disorder (POA: Unknown)  Resolved Problems:    * No resolved hospital problems. *      FOLLOW UP  No future appointments.  No follow-up provider specified.    MEDICATIONS ON DISCHARGE     Medication List        CONTINUE taking these medications        Instructions   amLODIPine 10 MG Tabs  Commonly known as: Norvasc   Take 10 mg by mouth every day.  Dose: 10 mg     atorvastatin 20 MG Tabs  Commonly known as: Lipitor   Take 20 mg by mouth every evening.  Dose: 20 mg     levothyroxine 125 MCG Tabs  Commonly known as: Synthroid   Take 125 mcg by mouth every morning on an empty stomach.  Dose: 125 mcg     lisinopril 40 MG tablet  Commonly known as: Prinivil   Take 40 mg by mouth every day.  Dose: 40 mg     metoprolol  MG Tb24  Commonly known as: Toprol XL   Take 100 mg by mouth every day.  Dose: 100 mg     mirtazapine 45 MG tablet  Commonly known as: Remeron   Take 45 mg by mouth every evening.  Dose: 45 mg     * PRESERVISION AREDS PO   Take 1 Tablet by mouth 2 times a day.  Dose: 1 Tablet     * CENTRUM SILVER 50+MEN PO   Take 1 Tablet by mouth every day.  Dose: 1 Tablet     vitamin D 1000 Unit (25 mcg) Tabs  Commonly known as: cholecalciferol   Take 1,000 Units by mouth every day.  Dose: 1,000 Units           * This list has 2 medication(s) that are the same as other medications prescribed for you. Read the directions carefully, and ask your doctor or other care provider to review them with you.                  Allergies  No Known Allergies    DIET  Orders Placed  This Encounter   Procedures    Diet NPO Restrict to: Sips with Medications     Standing Status:   Standing     Number of Occurrences:   1     Order Specific Question:   Diet NPO Restrict to:     Answer:   Sips with Medications [3]       ACTIVITY  As tolerated.  Weight bearing as tolerated    CONSULTATIONS  Cardiology  Urology    PROCEDURES  None     LABORATORY  Lab Results   Component Value Date    SODIUM 142 01/12/2024    POTASSIUM 3.8 01/12/2024    CHLORIDE 103 01/12/2024    CO2 22 01/12/2024    GLUCOSE 76 01/12/2024    BUN 14 01/12/2024    CREATININE 0.86 01/12/2024        Lab Results   Component Value Date    WBC 6.4 01/12/2024    HEMOGLOBIN 16.2 01/12/2024    HEMATOCRIT 46.6 01/12/2024    PLATELETCT 52 (L) 01/12/2024        Total time of the discharge process exceeds 51 minutes.

## 2024-01-12 NOTE — PROGRESS NOTES
Assisted patient to get dressed and gather belongings. Removed tele monitor and informed monitor tech. Orders put in for the discharge lounge. Patient in stable condition.

## 2024-01-12 NOTE — HOSPITAL COURSE
Patient is a 78 y.o.  male w/ a PMHx of hypertension, hyperlipidemia, hypothyroidism, TURP 10 years ago who presented to the ED on 1/10/2024 for painless hematuria. He reported that for about a week, he was taking Alleve twice in the morning. Stopped taking Aspirin >5 years ago per Cardiology. Urinalysis in the ED demonstrated large occult blood with over 150 RBCs. A CT abdomen and pelvis was done and did not show any stones or abnormalities in the kidneys. His labs was also remarkable of platelet of 54K. EKG was done which showed atrial fibrillation without any previous EKGs for comparison (patient does not report hx of atrial fibrillation). Urology was consulted and recommended nguyen with continuous bladder irrigation which demonstrated clots with outpatient follow up. Regarding the new diagnosis of A-fib, cardiology was consulted for possible inpatient cardioversion. Prior to discharge he had a trial off nguyen ***

## 2024-01-15 ENCOUNTER — HOSPITAL ENCOUNTER (OUTPATIENT)
Dept: LAB | Facility: MEDICAL CENTER | Age: 79
End: 2024-01-15
Attending: INTERNAL MEDICINE
Payer: MEDICARE

## 2024-01-15 DIAGNOSIS — D69.6 THROMBOCYTOPENIA (HCC): ICD-10-CM

## 2024-01-15 PROCEDURE — 36415 COLL VENOUS BLD VENIPUNCTURE: CPT

## 2024-01-16 LAB — VWF CP ACT/NOR PPP CHRO: 108 % (ref 40–130)

## 2024-01-19 ENCOUNTER — TELEPHONE (OUTPATIENT)
Dept: CARDIOLOGY | Facility: MEDICAL CENTER | Age: 79
End: 2024-01-19

## 2024-01-19 ENCOUNTER — OFFICE VISIT (OUTPATIENT)
Dept: CARDIOLOGY | Facility: MEDICAL CENTER | Age: 79
End: 2024-01-19
Attending: NURSE PRACTITIONER
Payer: MEDICARE

## 2024-01-19 ENCOUNTER — HOSPITAL ENCOUNTER (OUTPATIENT)
Dept: LAB | Facility: MEDICAL CENTER | Age: 79
End: 2024-01-19
Attending: NURSE PRACTITIONER
Payer: COMMERCIAL

## 2024-01-19 ENCOUNTER — HOSPITAL ENCOUNTER (OUTPATIENT)
Dept: LAB | Facility: MEDICAL CENTER | Age: 79
End: 2024-01-19
Attending: NURSE PRACTITIONER
Payer: MEDICARE

## 2024-01-19 VITALS
RESPIRATION RATE: 14 BRPM | WEIGHT: 223 LBS | HEART RATE: 70 BPM | HEIGHT: 69 IN | OXYGEN SATURATION: 97 % | SYSTOLIC BLOOD PRESSURE: 120 MMHG | DIASTOLIC BLOOD PRESSURE: 60 MMHG | BODY MASS INDEX: 33.03 KG/M2

## 2024-01-19 DIAGNOSIS — I48.19 PERSISTENT ATRIAL FIBRILLATION (HCC): ICD-10-CM

## 2024-01-19 DIAGNOSIS — I48.0 PAROXYSMAL ATRIAL FIBRILLATION (HCC): Primary | ICD-10-CM

## 2024-01-19 DIAGNOSIS — I10 ESSENTIAL HYPERTENSION, BENIGN: ICD-10-CM

## 2024-01-19 DIAGNOSIS — D69.6 THROMBOCYTOPENIA (HCC): ICD-10-CM

## 2024-01-19 DIAGNOSIS — I48.0 PAROXYSMAL ATRIAL FIBRILLATION (HCC): ICD-10-CM

## 2024-01-19 DIAGNOSIS — I45.5 SINUS PAUSE: ICD-10-CM

## 2024-01-19 LAB
BASOPHILS # BLD AUTO: 0.4 % (ref 0–1.8)
BASOPHILS # BLD: 0.02 K/UL (ref 0–0.12)
EKG IMPRESSION: NORMAL
EOSINOPHIL # BLD AUTO: 0.07 K/UL (ref 0–0.51)
EOSINOPHIL NFR BLD: 1.4 % (ref 0–6.9)
ERYTHROCYTE [DISTWIDTH] IN BLOOD BY AUTOMATED COUNT: 41.3 FL (ref 35.9–50)
HCT VFR BLD AUTO: 46.8 % (ref 42–52)
HGB BLD-MCNC: 16.6 G/DL (ref 14–18)
IMM GRANULOCYTES # BLD AUTO: 0.01 K/UL (ref 0–0.11)
IMM GRANULOCYTES NFR BLD AUTO: 0.2 % (ref 0–0.9)
LYMPHOCYTES # BLD AUTO: 0.98 K/UL (ref 1–4.8)
LYMPHOCYTES NFR BLD: 19.5 % (ref 22–41)
MCH RBC QN AUTO: 31.4 PG (ref 27–33)
MCHC RBC AUTO-ENTMCNC: 35.5 G/DL (ref 32.3–36.5)
MCV RBC AUTO: 88.5 FL (ref 81.4–97.8)
MONOCYTES # BLD AUTO: 0.59 K/UL (ref 0–0.85)
MONOCYTES NFR BLD AUTO: 11.7 % (ref 0–13.4)
NEUTROPHILS # BLD AUTO: 3.36 K/UL (ref 1.82–7.42)
NEUTROPHILS NFR BLD: 66.8 % (ref 44–72)
NRBC # BLD AUTO: 0 K/UL
NRBC BLD-RTO: 0 /100 WBC (ref 0–0.2)
PLATELET # BLD AUTO: 19 K/UL (ref 164–446)
PLATELET # BLD AUTO: 211 K/UL (ref 164–446)
PLATELETS.RETICULATED NFR BLD AUTO: 36.4 % (ref 0.6–13.1)
PMV BLD AUTO: 10.9 FL (ref 9–12.9)
RBC # BLD AUTO: 5.29 M/UL (ref 4.7–6.1)
WBC # BLD AUTO: 5 K/UL (ref 4.8–10.8)

## 2024-01-19 PROCEDURE — 85055 RETICULATED PLATELET ASSAY: CPT

## 2024-01-19 PROCEDURE — 93246 EXT ECG>7D<15D RECORDING: CPT

## 2024-01-19 PROCEDURE — 99215 OFFICE O/P EST HI 40 MIN: CPT | Performed by: NURSE PRACTITIONER

## 2024-01-19 PROCEDURE — 3078F DIAST BP <80 MM HG: CPT | Performed by: NURSE PRACTITIONER

## 2024-01-19 PROCEDURE — 3074F SYST BP LT 130 MM HG: CPT | Performed by: NURSE PRACTITIONER

## 2024-01-19 PROCEDURE — 85025 COMPLETE CBC W/AUTO DIFF WBC: CPT

## 2024-01-19 PROCEDURE — 99212 OFFICE O/P EST SF 10 MIN: CPT | Performed by: NURSE PRACTITIONER

## 2024-01-19 PROCEDURE — 36415 COLL VENOUS BLD VENIPUNCTURE: CPT

## 2024-01-19 PROCEDURE — 93010 ELECTROCARDIOGRAM REPORT: CPT | Performed by: INTERNAL MEDICINE

## 2024-01-19 PROCEDURE — 93005 ELECTROCARDIOGRAM TRACING: CPT | Performed by: NURSE PRACTITIONER

## 2024-01-19 ASSESSMENT — ENCOUNTER SYMPTOMS
WHEEZING: 0
PND: 0
DIZZINESS: 0
EYES NEGATIVE: 1
NERVOUS/ANXIOUS: 0
HALLUCINATIONS: 0
NEUROLOGICAL NEGATIVE: 1
ORTHOPNEA: 0
DEPRESSION: 0
CONSTITUTIONAL NEGATIVE: 1
SHORTNESS OF BREATH: 0
RESPIRATORY NEGATIVE: 1
CLAUDICATION: 0
PALPITATIONS: 0
MUSCULOSKELETAL NEGATIVE: 1
GASTROINTESTINAL NEGATIVE: 1
SENSORY CHANGE: 0
BRUISES/BLEEDS EASILY: 0
NAUSEA: 0

## 2024-01-19 ASSESSMENT — FIBROSIS 4 INDEX: FIB4 SCORE: 6.94

## 2024-01-19 NOTE — PROGRESS NOTES
Patient enrolled in the 14 day o Holter monitoring program per Shon Fung.  >Office hook-up, serial # [id number].  >Currently pending EOS.

## 2024-01-19 NOTE — TELEPHONE ENCOUNTER
MT    Caller: Sophy - Wife    Topic/issue: Patient went to lab today for stat lab draw that provider ordered. They were informed that sample was clumping by the time it got to testing. This is the second time this has happened with the blood. She is asking what they should do now? Sophy states that they were waiting on this to determine what medicine he should take. Please advise.    Callback Number: [phone] (Sophy's cell)    Thank you,  Dayana RICHARDSON

## 2024-01-19 NOTE — PROGRESS NOTES
Cardiology New Consult Note    DOS: 1/19/2024  [id number]  Júnior Gasca Errol    Chief complaint/Reason for consult: new onset atrial fibrillation and thrombocytopenia    HPI: Pt is a 78 y.o. male who presents to the clinic today in consultation for AF and thrombocytopenia. Patient has a past medical history significant for but not limited to: hypertension, BPH S/P TURP , dyslipidemia, hypothyroidism, recent hematuria episode, depression. Patient at [facility] ED on 11/10-11/12 for painless hematuria. TURP procedure was ten years ago. Also found to be in atrial fibrillation which at this visit he is still in on EKG but is asymptomatic. Patient last CBC was 52. Data for anticoagulation in thrombocytopenic patients is not robust, but as he is above 50,000 count we could trial some low dose Eliquis (renal dose 2.5mg) to see how he tolerates. CBC from 4 days ago was not resulted and upon call to lab it had hemolyzed. Patient not aware of this so stat CBC ordered today. Due to the unknown for possible reason for his thrombocytopenia it is difficult to ascertain direction to go with anticoagulant use. Per ED notes he had been using Aleve twice daily for a week for back pain, and this could have resulted in an NSAID induced thrombocytopenia. Hemoglobin and hematocrit and RBC were all normal and thus does not seem likely there was a bleed that could have resulted as this would have been abnormal as well.  State referral however to hematology for further diagnosis. Will wait for CBC to result before [proceeding forward. Cannot consider antiarrhythmic or cardioversion without ELVIRA at this time due to unknown risk of possible clot formation. Will get 2 week monitor to assess overall burden of AF that will help guide longitudinal care for this patient. Patient has not had any more bleeding since discharge from hospital.        Past Medical History:   Diagnosis Date    Depression     Hyperlipemia     Hypertension     Hypothyroid         Past Surgical History:   Procedure Laterality Date    SUBMANDIBLE ABSCESS INCISION AND DRAINAGE Left 9/15/2018    Procedure: SUBMANDIBLE ABSCESS INCISION AND DRAINAGE;  Surgeon: Adarsh Gee M.D.;  Location: SURGERY [facility];  Service: Dental    DENTAL EXTRACTION(S) Left 9/15/2018    Procedure: DENTAL EXTRACTION(S) #18;  Surgeon: Adarsh Gee M.D.;  Location: SURGERY [facility];  Service: Dental       Social History     Socioeconomic History    Marital status:      Spouse name: Not on file    Number of children: Not on file    Years of education: Not on file    Highest education level: Not on file   Occupational History    Not on file   Tobacco Use    Smoking status: Never    Smokeless tobacco: Never   Vaping Use    Vaping Use: Never used   Substance and Sexual Activity    Alcohol use: Not Currently    Drug use: Never    Sexual activity: Not on file   Other Topics Concern    Not on file   Social History Narrative    Not on file     Social Determinants of Health     Financial Resource Strain: Not on file   Food Insecurity: Not on file   Transportation Needs: Not on file   Physical Activity: Not on file   Stress: Not on file   Social Connections: Not on file   Intimate Partner Violence: Not on file   Housing Stability: Not on file       History reviewed. No pertinent family history.    No Known Allergies    Current Outpatient Medications   Medication Sig Dispense Refill    Multiple Vitamins-Minerals (PRESERVISION AREDS PO) Take 1 Tablet by mouth 2 times a day.      Multiple Vitamins-Minerals (CENTRUM SILVER 50+MEN PO) Take 1 Tablet by mouth every day.      levothyroxine (SYNTHROID) 125 MCG Tab Take 125 mcg by mouth every morning on an empty stomach.      amLODIPine (NORVASC) 10 MG Tab Take 10 mg by mouth every day.      metoprolol SR (TOPROL XL) 100 MG TABLET SR 24 HR Take 100 mg by mouth every day.      lisinopril (PRINIVIL, ZESTRIL) 40 MG tablet Take 40 mg by mouth every day.       atorvastatin (LIPITOR) 20 MG Tab Take 20 mg by mouth every evening.      mirtazapine (REMERON) 45 MG tablet Take 45 mg by mouth every evening.      vitamin D (CHOLECALCIFEROL) 1000 UNIT Tab Take 1,000 Units by mouth every day.       No current facility-administered medications for this visit.       Vitals:    01/19/24 0954   BP: 120/60   Pulse: 70   Resp: 14   SpO2: 97%         Review of Systems   Constitutional: Negative.  Negative for malaise/fatigue.   HENT: Negative.     Eyes: Negative.    Respiratory: Negative.  Negative for shortness of breath and wheezing.    Cardiovascular:  Negative for chest pain, palpitations, orthopnea, claudication, leg swelling and PND.   Gastrointestinal: Negative.  Negative for nausea.   Genitourinary: Negative.    Musculoskeletal: Negative.    Skin: Negative.    Neurological: Negative.  Negative for dizziness and sensory change.   Endo/Heme/Allergies: Negative.  Does not bruise/bleed easily.   Psychiatric/Behavioral:  Negative for depression and hallucinations. The patient is not nervous/anxious.         Prior echo results reviewed: LVEF 65%; moderately dilated left atrium suggestive of atrial fibrillation been present longer than found on EKG         EKG interpreted by me: Atrial fibrillation     Physical Exam  Constitutional:       Appearance: Normal appearance.   HENT:      Head: Normocephalic.   Eyes:      Pupils: Pupils are equal, round, and reactive to light.   Neck:      Vascular: No JVD.   Cardiovascular:      Rate and Rhythm: Normal rate. Rhythm irregular.      Pulses: Normal pulses.      Heart sounds: Normal heart sounds.   Pulmonary:      Effort: Pulmonary effort is normal.      Breath sounds: Normal breath sounds.   Abdominal:      General: Abdomen is flat.      Palpations: Abdomen is soft.   Musculoskeletal:      Cervical back: Normal range of motion.      Right lower leg: No edema.      Left lower leg: No edema.   Skin:     General: Skin is warm and dry.  "  Neurological:      Mental Status: He is alert and oriented to person, place, and time.   Psychiatric:         Mood and Affect: Mood normal.         Behavior: Behavior normal.          Data:  Lipids:   No results found for: \"CHOLSTRLTOT\", \"TRIGLYCERIDE\", \"HDL\", \"LDL\"     BMP:  Lab Results   Component Value Date/Time    SODIUM 142 01/12/2024 0157    POTASSIUM 3.8 01/12/2024 0157    CHLORIDE 103 01/12/2024 0157    CO2 22 01/12/2024 0157    GLUCOSE 76 01/12/2024 0157    BUN 14 01/12/2024 0157    CREATININE 0.86 01/12/2024 0157    CALCIUM 8.8 01/12/2024 0157    ANION 17.0 (H) 01/12/2024 0157       GFR:  Lab Results   Component Value Date/Time    IFAFRICA >60 09/15/2018 0930    IFNOTAFR >60 09/15/2018 0930        TSH:   Lab Results   Component Value Date/Time    TSHULTRASEN 3.290 01/11/2024 0331       MAGNESIUM:  Lab Results   Component Value Date/Time    MAGNESIUM 2.3 01/10/2024 2236        THYROXINE (T4):   No results found for: \"FREEDIR\"     CBC:   Lab Results   Component Value Date/Time    WBC 6.4 01/12/2024 01:57 AM    RBC 5.14 01/12/2024 01:57 AM    HEMOGLOBIN 16.2 01/12/2024 01:57 AM    HEMATOCRIT 46.6 01/12/2024 01:57 AM    MCV 90.7 01/12/2024 01:57 AM    MCH 31.5 01/12/2024 01:57 AM    MCHC 34.8 01/12/2024 01:57 AM    RDW 43.0 01/12/2024 01:57 AM    PLATELETCT 52 (L) 01/12/2024 01:57 AM    MPV 12.4 01/12/2024 01:57 AM    NEUTSPOLYS 74.30 (H) 01/12/2024 01:57 AM    LYMPHOCYTES 14.50 (L) 01/12/2024 01:57 AM    MONOCYTES 9.80 01/12/2024 01:57 AM    EOSINOPHILS 0.90 01/12/2024 01:57 AM    BASOPHILS 0.30 01/12/2024 01:57 AM    IMMGRAN 0.20 01/12/2024 01:57 AM    NRBC 0.00 01/12/2024 01:57 AM    NEUTS 4.72 01/12/2024 01:57 AM    LYMPHS 0.92 (L) 01/12/2024 01:57 AM    MONOS 0.62 01/12/2024 01:57 AM    EOS 0.06 01/12/2024 01:57 AM    BASO 0.02 01/12/2024 01:57 AM    IMMGRANAB 0.01 01/12/2024 01:57 AM    NRBCAB 0.00 01/12/2024 01:57 AM        CBC w/o DIFF  Lab Results   Component Value Date/Time    WBC 6.4 01/12/2024 " "01:57 AM    RBC 5.14 01/12/2024 01:57 AM    HEMOGLOBIN 16.2 01/12/2024 01:57 AM    MCV 90.7 01/12/2024 01:57 AM    MCH 31.5 01/12/2024 01:57 AM    MCHC 34.8 01/12/2024 01:57 AM    RDW 43.0 01/12/2024 01:57 AM    MPV 12.4 01/12/2024 01:57 AM       LIVER:  Lab Results   Component Value Date/Time    ALKPHOSPHAT 67 01/12/2024 01:57 AM    ASTSGOT 26 01/12/2024 01:57 AM    ALTSGPT 30 01/12/2024 01:57 AM    TBILIRUBIN 0.6 01/12/2024 01:57 AM       BNP:  No results found for: \"BNPBTYPENAT\"    PT/INR:  Lab Results   Component Value Date/Time    PROTHROMBTM 13.2 01/10/2024 10:36 PM    PROTHROMBTM 13.6 09/15/2018 09:30 AM    INR 0.99 01/10/2024 10:36 PM    INR 1.05 09/15/2018 09:30 AM             Impression/Plan:  Thrombocytopenia (HCC)   - stat CBC as last one hemolyzed   - reassess platelet count   - normal a few years ago   - could be NSAID induced  - referral to hematology   - hold antiplatelet and anticoagulation until results   - long term if chronic pain is an issue and the thrombocytopenia can be attributed to NSAID use Watchman may be a more appropriate risk prevention strategy      AF (atrial fibrillation) (HCC)   - 2 week monitor to assess burden   - unknown length of time in AF   - awaiting CBC results prior to starting anticoagulation   - The patients [id number] score is 3. HAS-BLED score is 3   - once protected from stroke can proceed with more aggressive rhythm control strategies   - long term may benefit from Watchman for stroke protection   - continue metoprolol for rate control          Essential hypertension, benign   - blood pressure well controlled today   - continue current regimen of amlodipine, lisinopril, and metoprolol              A total of 45 minutes of time was spent on day of encounter reviewing medical record, performing history and examination, counseling, ordering medication/test/consults, collaborating with referring service, and documentation.    Shon Fung [facility]-EP  Cardiac " Electrophysiology

## 2024-01-19 NOTE — ASSESSMENT & PLAN NOTE
- blood pressure well controlled today   - continue current regimen of amlodipine, lisinopril, and metoprolol

## 2024-01-19 NOTE — ASSESSMENT & PLAN NOTE
- 2 week monitor to assess burden   - unknown length of time in AF   - awaiting CBC results prior to starting anticoagulation   - The patients [id number] score is 3. HAS-BLED score is 3   - once protected from stroke can proceed with more aggressive rhythm control strategies   - long term may benefit from Watchman for stroke protection   - continue metoprolol for rate control

## 2024-01-19 NOTE — TELEPHONE ENCOUNTER
Phone Number Called: [phone]    Call outcome: Spoke to patient regarding message below.    Message: Called to inform patient wife that I am not sure the best way to prevent the blood draw from clotting. This would be a question for the lab staff. Patient states they spoke to lab staff and they did not tell them any way to prevent the blood from clotting. They are getting their labs redrawn right now.

## 2024-01-19 NOTE — ASSESSMENT & PLAN NOTE
- stat CBC as last one hemolyzed   - reassess platelet count   - normal a few years ago   - could be NSAID induced  - referral to hematology   - hold antiplatelet and anticoagulation until results   - long term if chronic pain is an issue and the thrombocytopenia can be attributed to NSAID use Watchman may be a more appropriate risk prevention strategy

## 2024-01-20 NOTE — TELEPHONE ENCOUNTER
Caller:   Sophy    Topic/issue: Sophy just wanted to advice that Júnior did have the labs done again today.     Callback Number:  [phone]    Thank you,   Elana ARCHER

## 2024-01-22 ENCOUNTER — TELEPHONE (OUTPATIENT)
Dept: CARDIOLOGY | Facility: MEDICAL CENTER | Age: 79
End: 2024-01-22
Payer: COMMERCIAL

## 2024-01-22 DIAGNOSIS — I48.0 PAROXYSMAL ATRIAL FIBRILLATION (HCC): ICD-10-CM

## 2024-01-22 NOTE — TELEPHONE ENCOUNTER
MT       Caller: Sophy (wife)    Topic/issue: Patient was prescribed Eliquis and [facility] was sent to [facility], they are asking too much money.  Patient would like to inquire about the patient assistance program as well as discuss options of getting him on this medication sooner than later, please advise     Callback Number: [phone]    Thank You   Mary GALLEGOS

## 2024-01-23 ENCOUNTER — OFFICE VISIT (OUTPATIENT)
Dept: CARDIOLOGY | Facility: MEDICAL CENTER | Age: 79
End: 2024-01-23
Attending: NURSE PRACTITIONER
Payer: MEDICARE

## 2024-01-23 VITALS
HEIGHT: 69 IN | OXYGEN SATURATION: 92 % | WEIGHT: 225 LBS | HEART RATE: 76 BPM | BODY MASS INDEX: 33.33 KG/M2 | RESPIRATION RATE: 14 BRPM | DIASTOLIC BLOOD PRESSURE: 84 MMHG | SYSTOLIC BLOOD PRESSURE: 126 MMHG

## 2024-01-23 DIAGNOSIS — D69.6 THROMBOCYTOPENIA (HCC): ICD-10-CM

## 2024-01-23 DIAGNOSIS — I48.0 PAROXYSMAL ATRIAL FIBRILLATION (HCC): ICD-10-CM

## 2024-01-23 PROCEDURE — 99212 OFFICE O/P EST SF 10 MIN: CPT | Performed by: NURSE PRACTITIONER

## 2024-01-23 PROCEDURE — 3079F DIAST BP 80-89 MM HG: CPT | Performed by: NURSE PRACTITIONER

## 2024-01-23 PROCEDURE — 99213 OFFICE O/P EST LOW 20 MIN: CPT | Performed by: NURSE PRACTITIONER

## 2024-01-23 PROCEDURE — 3074F SYST BP LT 130 MM HG: CPT | Performed by: NURSE PRACTITIONER

## 2024-01-23 ASSESSMENT — FIBROSIS 4 INDEX: FIB4 SCORE: 18.99

## 2024-01-23 NOTE — TELEPHONE ENCOUNTER
"Received call from MT regarding patient CBC and citrated platelet count result differences.    Call placed to clinical lab for explanation of different results. The patient is a \"platelet clumper\". This causes the lab sample to clump. When the lab sample clumps the platelet count on the CBC is artificially low as the machine cannot count the clumped platelets. In these cases the  can see the clumps on a slide and will have the patient complete Citrated Platelet Count for a more accurate count. Clumping is in an EDTA tube can be due to a patient who rapidly clumps their platelets or a reaction to the EDTA. The Citrated Platelet count is more accurate.    FYI to MT.   "

## 2024-01-24 ENCOUNTER — TELEPHONE (OUTPATIENT)
Dept: CARDIOLOGY | Facility: MEDICAL CENTER | Age: 79
End: 2024-01-24
Payer: COMMERCIAL

## 2024-01-24 ASSESSMENT — ENCOUNTER SYMPTOMS
SHORTNESS OF BREATH: 0
CONSTITUTIONAL NEGATIVE: 1
CLAUDICATION: 0
PALPITATIONS: 0
NERVOUS/ANXIOUS: 0
EYES NEGATIVE: 1
HALLUCINATIONS: 0
ORTHOPNEA: 0
DEPRESSION: 0
BRUISES/BLEEDS EASILY: 0
RESPIRATORY NEGATIVE: 1
MUSCULOSKELETAL NEGATIVE: 1
NEUROLOGICAL NEGATIVE: 1
WHEEZING: 0
PND: 0
NAUSEA: 0
GASTROINTESTINAL NEGATIVE: 1
SENSORY CHANGE: 0
DIZZINESS: 0

## 2024-01-24 NOTE — PROGRESS NOTES
Atrial Fibrillation Follow up Note    DOS: 1/23/2024  [id number]  Júnior Gasca Errol    Chief complaint/Reason for consult: post testing and resolution of thrombocytopenia    HPI: Pt is a 78 y.o. male who presents to the clinic today in follow up for atrial fibrillation.  Patient has a past medical history significant for but not limited to: hypertension, BPH S/P TURP , dyslipidemia, hypothyroidism, recent hematuria episode, acute thrombocytopenia episode, atrial fibrillation,  depression. Patient had hemolyzed lab that were still run despite the lab knowing the results would be severely abnormal. Citrated platelet count normal at 211. Patient started Eliquis 5mg twice daily for stroke protection. Has had some blood clots with bleeding a few days ago when urinating and today in office peed with a blood clot coming out but no blood. Still trying to help patient get in to see urology. If patient starts having bleeding when urinating again, cut dose of Eliquis in half and if continues stop. Will also keep hematology referral in place in the event that patient continues to have bleeding and possible thrombocytopenia issues. If this is case will need to consider Watchman with 6 month dual antiplatelet strategy for stroke protection. IF uninterrupted for 3-4 weeks will start pursuing more aggressive rhythm control strategy for patient.   Currently wearing monitor to assess burden of AF    Past Medical History:   Diagnosis Date    Depression     Hyperlipemia     Hypertension     Hypothyroid        Past Surgical History:   Procedure Laterality Date    SUBMANDIBLE ABSCESS INCISION AND DRAINAGE Left 9/15/2018    Procedure: SUBMANDIBLE ABSCESS INCISION AND DRAINAGE;  Surgeon: Adarsh Gee M.D.;  Location: [facility];  Service: Dental    DENTAL EXTRACTION(S) Left 9/15/2018    Procedure: DENTAL EXTRACTION(S) #18;  Surgeon: Adarsh Gee M.D.;  Location: [facility];  Service: Dental       Social  History     Socioeconomic History    Marital status:      Spouse name: Not on file    Number of children: Not on file    Years of education: Not on file    Highest education level: Not on file   Occupational History    Not on file   Tobacco Use    Smoking status: Never    Smokeless tobacco: Never   Vaping Use    Vaping Use: Never used   Substance and Sexual Activity    Alcohol use: Not Currently    Drug use: Never    Sexual activity: Not on file   Other Topics Concern    Not on file   Social History Narrative    Not on file     Social Determinants of Health     Financial Resource Strain: Not on file   Food Insecurity: Not on file   Transportation Needs: Not on file   Physical Activity: Not on file   Stress: Not on file   Social Connections: Not on file   Intimate Partner Violence: Not on file   Housing Stability: Not on file       History reviewed. No pertinent family history.    No Known Allergies    Current Outpatient Medications   Medication Sig Dispense Refill    apixaban (ELIQUIS) 5mg Tab Take 1 Tablet by mouth 2 times a day. 180 Tablet 3    Multiple Vitamins-Minerals (PRESERVISION AREDS PO) Take 1 Tablet by mouth 2 times a day.      Multiple Vitamins-Minerals (CENTRUM SILVER 50+MEN PO) Take 1 Tablet by mouth every day.      levothyroxine (SYNTHROID) 125 MCG Tab Take 125 mcg by mouth every morning on an empty stomach.      amLODIPine (NORVASC) 10 MG Tab Take 10 mg by mouth every day.      metoprolol SR (TOPROL XL) 100 MG TABLET SR 24 HR Take 100 mg by mouth every day.      lisinopril (PRINIVIL, ZESTRIL) 40 MG tablet Take 40 mg by mouth every day.      atorvastatin (LIPITOR) 20 MG Tab Take 20 mg by mouth every evening.      mirtazapine (REMERON) 45 MG tablet Take 45 mg by mouth every evening.      vitamin D (CHOLECALCIFEROL) 1000 UNIT Tab Take 1,000 Units by mouth every day.       No current facility-administered medications for this visit.       Vitals:    01/23/24 1246   BP: 126/84   Pulse: 76   Resp:  "14   SpO2: 92%         Review of Systems   Constitutional: Negative.  Negative for malaise/fatigue.   HENT: Negative.     Eyes: Negative.    Respiratory: Negative.  Negative for shortness of breath and wheezing.    Cardiovascular:  Negative for chest pain, palpitations, orthopnea, claudication, leg swelling and PND.   Gastrointestinal: Negative.  Negative for nausea.   Genitourinary: Negative.    Musculoskeletal: Negative.    Skin: Negative.    Neurological: Negative.  Negative for dizziness and sensory change.   Endo/Heme/Allergies: Negative.  Does not bruise/bleed easily.   Psychiatric/Behavioral:  Negative for depression and hallucinations. The patient is not nervous/anxious.             Physical Exam  Constitutional:       Appearance: Normal appearance.   HENT:      Head: Normocephalic.   Eyes:      Pupils: Pupils are equal, round, and reactive to light.   Neck:      Vascular: No JVD.   Cardiovascular:      Rate and Rhythm: Normal rate and regular rhythm.      Pulses: Normal pulses.      Heart sounds: Normal heart sounds.   Pulmonary:      Effort: Pulmonary effort is normal.      Breath sounds: Normal breath sounds.   Abdominal:      General: Abdomen is flat.      Palpations: Abdomen is soft.   Musculoskeletal:      Cervical back: Normal range of motion.      Right lower leg: No edema.      Left lower leg: No edema.   Skin:     General: Skin is warm and dry.   Neurological:      Mental Status: He is alert and oriented to person, place, and time.   Psychiatric:         Mood and Affect: Mood normal.         Behavior: Behavior normal.          Data:  Lipids:   No results found for: \"CHOLSTRLTOT\", \"TRIGLYCERIDE\", \"HDL\", \"LDL\"     BMP:  Lab Results   Component Value Date/Time    SODIUM 142 01/12/2024 0157    POTASSIUM 3.8 01/12/2024 0157    CHLORIDE 103 01/12/2024 0157    CO2 22 01/12/2024 0157    GLUCOSE 76 01/12/2024 0157    BUN 14 01/12/2024 0157    CREATININE 0.86 01/12/2024 0157    CALCIUM 8.8 01/12/2024 0157    " "ANION 17.0 (H) 01/12/2024 0157       GFR:  Lab Results   Component Value Date/Time    IFAFRICA >60 09/15/2018 0930    IFNOTAFR >60 09/15/2018 0930        TSH:   Lab Results   Component Value Date/Time    TSHULTRASEN 3.290 01/11/2024 0331       MAGNESIUM:  Lab Results   Component Value Date/Time    MAGNESIUM 2.3 01/10/2024 2236        THYROXINE (T4):   No results found for: \"FREEDIR\"     CBC:   Lab Results   Component Value Date/Time    WBC 5.0 01/19/2024 04:05 PM    RBC 5.29 01/19/2024 04:05 PM    HEMOGLOBIN 16.6 01/19/2024 04:05 PM    HEMATOCRIT 46.8 01/19/2024 04:05 PM    MCV 88.5 01/19/2024 04:05 PM    MCH 31.4 01/19/2024 04:05 PM    MCHC 35.5 01/19/2024 04:05 PM    RDW 41.3 01/19/2024 04:05 PM    PLATELETCT 19 (LL) 01/19/2024 04:05 PM    MPV 10.9 01/19/2024 04:05 PM    NEUTSPOLYS 66.80 01/19/2024 04:05 PM    LYMPHOCYTES 19.50 (L) 01/19/2024 04:05 PM    MONOCYTES 11.70 01/19/2024 04:05 PM    EOSINOPHILS 1.40 01/19/2024 04:05 PM    BASOPHILS 0.40 01/19/2024 04:05 PM    IMMGRAN 0.20 01/19/2024 04:05 PM    NRBC 0.00 01/19/2024 04:05 PM    NEUTS 3.36 01/19/2024 04:05 PM    LYMPHS 0.98 (L) 01/19/2024 04:05 PM    MONOS 0.59 01/19/2024 04:05 PM    EOS 0.07 01/19/2024 04:05 PM    BASO 0.02 01/19/2024 04:05 PM    IMMGRANAB 0.01 01/19/2024 04:05 PM    NRBCAB 0.00 01/19/2024 04:05 PM        CBC w/o DIFF  Lab Results   Component Value Date/Time    WBC 5.0 01/19/2024 04:05 PM    RBC 5.29 01/19/2024 04:05 PM    HEMOGLOBIN 16.6 01/19/2024 04:05 PM    MCV 88.5 01/19/2024 04:05 PM    MCH 31.4 01/19/2024 04:05 PM    MCHC 35.5 01/19/2024 04:05 PM    RDW 41.3 01/19/2024 04:05 PM    MPV 10.9 01/19/2024 04:05 PM       LIVER:  Lab Results   Component Value Date/Time    ALKPHOSPHAT 67 01/12/2024 01:57 AM    ASTSGOT 26 01/12/2024 01:57 AM    ALTSGPT 30 01/12/2024 01:57 AM    TBILIRUBIN 0.6 01/12/2024 01:57 AM       BNP:  No results found for: \"BNPBTYPENAT\"    PT/INR:  Lab Results   Component Value Date/Time    PROTHROMBTM 13.2 01/10/2024 " 10:36 PM    PROTHROMBTM 13.6 09/15/2018 09:30 AM    INR 0.99 01/10/2024 10:36 PM    INR 1.05 09/15/2018 09:30 AM             Impression/Plan:  Thrombocytopenia (HCC)   - last citrated platelet count normalized at 211   - repeat citrated platelet count in 7 days    - referral to hemology still in place   - [facility] as he has had a few more clots come out while peeing    AF (atrial fibrillation) (HCC)   - started Eliquis 5mg twice daily for stroke protection   - if starts having bleeding during urination cut dose in half and contact me    - may need to consider Watchman as stroke protectant if bleeding becomes issue or patient has more thrombocytopenic episodes    - currently wearing monitor for AF burden   - return to clinic in one month              A total of 28 minutes of time was spent on day of encounter reviewing medical record, performing history and examination, counseling, ordering medication/test/consults, collaborating with referring service, and documentation.    Shon Fung AGACNP-EP  Cardiac Electrophysiology

## 2024-01-24 NOTE — ASSESSMENT & PLAN NOTE
- last citrated platelet count normalized at 211   - repeat citrated platelet count in 7 days    - referral to hemology still in place   - trying to [facility] as he has had a few more clots come out while peeing

## 2024-01-24 NOTE — ASSESSMENT & PLAN NOTE
- started Eliquis 5mg twice daily for stroke protection   - if starts having bleeding during urination cut dose in half and contact me    - may need to consider Watchman as stroke protectant if bleeding becomes issue or patient has more thrombocytopenic episodes    - currently wearing monitor for AF burden   - return to clinic in one month

## 2024-01-24 NOTE — TELEPHONE ENCOUNTER
Called pt, left VM to discuss if he was able to get [facility] to be seen. Asked for patient to call back.

## 2024-02-09 ENCOUNTER — TELEPHONE (OUTPATIENT)
Dept: CARDIOLOGY | Facility: MEDICAL CENTER | Age: 79
End: 2024-02-09
Payer: COMMERCIAL

## 2024-02-17 PROCEDURE — 93248 EXT ECG>7D<15D REV&INTERPJ: CPT | Performed by: INTERNAL MEDICINE

## 2024-02-23 ENCOUNTER — OFFICE VISIT (OUTPATIENT)
Dept: CARDIOLOGY | Facility: MEDICAL CENTER | Age: 79
End: 2024-02-23
Attending: NURSE PRACTITIONER
Payer: MEDICARE

## 2024-02-23 VITALS
BODY MASS INDEX: 32.73 KG/M2 | WEIGHT: 221 LBS | OXYGEN SATURATION: 95 % | HEIGHT: 69 IN | RESPIRATION RATE: 16 BRPM | SYSTOLIC BLOOD PRESSURE: 126 MMHG | HEART RATE: 69 BPM | DIASTOLIC BLOOD PRESSURE: 76 MMHG

## 2024-02-23 DIAGNOSIS — I10 ESSENTIAL HYPERTENSION, BENIGN: ICD-10-CM

## 2024-02-23 DIAGNOSIS — I48.0 PAROXYSMAL ATRIAL FIBRILLATION (HCC): Primary | ICD-10-CM

## 2024-02-23 LAB — EKG IMPRESSION: NORMAL

## 2024-02-23 PROCEDURE — 99213 OFFICE O/P EST LOW 20 MIN: CPT | Performed by: NURSE PRACTITIONER

## 2024-02-23 PROCEDURE — 93005 ELECTROCARDIOGRAM TRACING: CPT | Performed by: NURSE PRACTITIONER

## 2024-02-23 PROCEDURE — 3078F DIAST BP <80 MM HG: CPT | Performed by: NURSE PRACTITIONER

## 2024-02-23 PROCEDURE — 93010 ELECTROCARDIOGRAM REPORT: CPT | Performed by: INTERNAL MEDICINE

## 2024-02-23 PROCEDURE — 3074F SYST BP LT 130 MM HG: CPT | Performed by: NURSE PRACTITIONER

## 2024-02-23 PROCEDURE — 99214 OFFICE O/P EST MOD 30 MIN: CPT | Performed by: NURSE PRACTITIONER

## 2024-02-23 RX ORDER — FINASTERIDE 5 MG/1
1 TABLET, FILM COATED ORAL
COMMUNITY
Start: 2024-01-24

## 2024-02-23 RX ORDER — FLECAINIDE ACETATE 50 MG/1
50 TABLET ORAL 2 TIMES DAILY
Qty: 60 TABLET | Refills: 3 | Status: SHIPPED | OUTPATIENT
Start: 2024-02-23

## 2024-02-23 ASSESSMENT — ENCOUNTER SYMPTOMS
CLAUDICATION: 0
DIZZINESS: 0
WHEEZING: 0
RESPIRATORY NEGATIVE: 1
DEPRESSION: 0
SENSORY CHANGE: 0
NAUSEA: 0
SHORTNESS OF BREATH: 0
MUSCULOSKELETAL NEGATIVE: 1
NERVOUS/ANXIOUS: 0
NEUROLOGICAL NEGATIVE: 1
BRUISES/BLEEDS EASILY: 0
ORTHOPNEA: 0
PND: 0
CONSTITUTIONAL NEGATIVE: 1
EYES NEGATIVE: 1
HALLUCINATIONS: 0
GASTROINTESTINAL NEGATIVE: 1
PALPITATIONS: 0

## 2024-02-23 ASSESSMENT — FIBROSIS 4 INDEX: FIB4 SCORE: 18.99

## 2024-02-23 NOTE — PROGRESS NOTES
Atrial Fibrillation Follow up Note    DOS: 2/23/2024   [id number]  Júnior Gasca Errol    Chief complaint/Reason for consult: heart monitor results and longitudinal planning for persistent AF    HPI: Pt is a 78 y.o. male who presents to the clinic today in follow up for atrial fibrillation.  Patient has a past medical history significant for but not limited to: hypertension, BPH S/P TURP , dyslipidemia, hypothyroidism, recent hematuria episode, acute thrombocytopenia episode, atrial fibrillation,  depression. Patient has had no more issues with bleeding and no more abnormal platelet labs. Tolerating Eliquis fine. Patient monitor showed 100% atrial fibrillation burden rate controlled. Two nocturnal pauses which we discussed and will repeat monitoring should he have any symptoms during th daytime. Very asymptomatic to AF. Discussed strategy to return patient to sinus and maintain long term. Will start with medication therapy, flecainide. Flecainide is a class 1C antiarrhythmic medication that can be used daily or as needed for rhythm control in patients with atrial fibrillation. Flecainide has risks factors which the patient and I have discussed. Risks to include but not limited to: ventricular pro arrhythmic less than 3%, can cause 1:1 flutter by slowing the AV node conduction, dizziness in about 1 in 5 patients, blurred vision in approximately 1 in 6, chest pain or tremors less than 5% of the time. Flecainide should not be used in patients with structural compromise as the risks factors for all the above especially ventricular arrhythmias increases. Patient and I have discussed and they have verbalized understanding and wish to proceed with flecainide medication for rhythm control.  After two weeks will get EKG to see if chemical conversion to sinus if not will plan for cardioversion to restore sinus. The risks, benefits, and alternatives to electrical cardioversion were discussed in great detail. We discussed that  conversion of atrial fibrillation to normal rhythm, at least transiently, is successful in 90 to 95% of patients. However, maintaining a normal rhythm depends on a number of factors, including underlying heart disease and antiarrhythmic medications. Atrial fibrillation often recurs with time and other treatments may be necessary. Risks of  cardioversion are low as long as anticoagulation issues are handled appropriately. There is a small (less than 1%) risk of embolic events, including stroke. Risks of electrical shock include mild muscle soreness and mild skin burning at the site of electrode placement. There is also a risk that cardioversion can stimulate more dangerous arrhythmias. The patient verbalized understanding of these potential complications and wishes to proceed with this procedure.      Past Medical History:   Diagnosis Date    Depression     Hyperlipemia     Hypertension     Hypothyroid        Past Surgical History:   Procedure Laterality Date    SUBMANDIBLE ABSCESS INCISION AND DRAINAGE Left 9/15/2018    Procedure: SUBMANDIBLE ABSCESS INCISION AND DRAINAGE;  Surgeon: Adarsh Gee M.D.;  Location: [facility];  Service: Dental    DENTAL EXTRACTION(S) Left 9/15/2018    Procedure: DENTAL EXTRACTION(S) #18;  Surgeon: Adarsh Gee M.D.;  Location: [facility];  Service: Dental       Social History     Socioeconomic History    Marital status:      Spouse name: Not on file    Number of children: Not on file    Years of education: Not on file    Highest education level: Not on file   Occupational History    Not on file   Tobacco Use    Smoking status: Never    Smokeless tobacco: Never   Vaping Use    Vaping Use: Never used   Substance and Sexual Activity    Alcohol use: Not Currently    Drug use: Never    Sexual activity: Not on file   Other Topics Concern    Not on file   Social History Narrative    Not on file     Social Determinants of Health     Financial Resource  Strain: Not on file   Food Insecurity: Not on file   Transportation Needs: Not on file   Physical Activity: Not on file   Stress: Not on file   Social Connections: Not on file   Intimate Partner Violence: Not on file   Housing Stability: Not on file       No family history on file.    No Known Allergies    Current Outpatient Medications   Medication Sig Dispense Refill    finasteride (PROSCAR) 5 MG Tab Take 1 Tablet by mouth every day.      flecainide (TAMBOCOR) 50 MG tablet Take 1 Tablet by mouth 2 times a day. 60 Tablet 3    apixaban (ELIQUIS) 5mg Tab Take 1 Tablet by mouth 2 times a day. 180 Tablet 3    Multiple Vitamins-Minerals (PRESERVISION AREDS PO) Take 1 Tablet by mouth 2 times a day.      Multiple Vitamins-Minerals (CENTRUM SILVER 50+MEN PO) Take 1 Tablet by mouth every day.      levothyroxine (SYNTHROID) 125 MCG Tab Take 125 mcg by mouth every morning on an empty stomach.      amLODIPine (NORVASC) 10 MG Tab Take 10 mg by mouth every day.      metoprolol SR (TOPROL XL) 100 MG TABLET SR 24 HR Take 100 mg by mouth every day.      lisinopril (PRINIVIL, ZESTRIL) 40 MG tablet Take 40 mg by mouth every day.      atorvastatin (LIPITOR) 20 MG Tab Take 20 mg by mouth every evening.      mirtazapine (REMERON) 45 MG tablet Take 45 mg by mouth every evening.      vitamin D (CHOLECALCIFEROL) 1000 UNIT Tab Take 1,000 Units by mouth every day.       No current facility-administered medications for this visit.       Vitals:    02/23/24 0907   BP: 126/76   Pulse: 69   Resp: 16   SpO2: 95%         Review of Systems   Constitutional: Negative.  Negative for malaise/fatigue.   HENT: Negative.     Eyes: Negative.    Respiratory: Negative.  Negative for shortness of breath and wheezing.    Cardiovascular:  Negative for chest pain, palpitations, orthopnea, claudication, leg swelling and PND.   Gastrointestinal: Negative.  Negative for nausea.   Genitourinary: Negative.    Musculoskeletal: Negative.    Skin: Negative.   "  Neurological: Negative.  Negative for dizziness and sensory change.   Endo/Heme/Allergies: Negative.  Does not bruise/bleed easily.   Psychiatric/Behavioral:  Negative for depression and hallucinations. The patient is not nervous/anxious.         EKG: Atrial Fibrillation     Physical Exam  Constitutional:       Appearance: Normal appearance.   HENT:      Head: Normocephalic.   Eyes:      Pupils: Pupils are equal, round, and reactive to light.   Neck:      Vascular: No JVD.   Cardiovascular:      Rate and Rhythm: Normal rate and regular rhythm.      Pulses: Normal pulses.      Heart sounds: Normal heart sounds.   Pulmonary:      Effort: Pulmonary effort is normal.      Breath sounds: Normal breath sounds.   Abdominal:      General: Abdomen is flat.      Palpations: Abdomen is soft.   Musculoskeletal:      Cervical back: Normal range of motion.      Right lower leg: No edema.      Left lower leg: No edema.   Skin:     General: Skin is warm and dry.   Neurological:      Mental Status: He is alert and oriented to person, place, and time.   Psychiatric:         Mood and Affect: Mood normal.         Behavior: Behavior normal.          Data:  Lipids:   No results found for: \"CHOLSTRLTOT\", \"TRIGLYCERIDE\", \"HDL\", \"LDL\"     BMP:  Lab Results   Component Value Date/Time    SODIUM 142 01/12/2024 0157    POTASSIUM 3.8 01/12/2024 0157    CHLORIDE 103 01/12/2024 0157    CO2 22 01/12/2024 0157    GLUCOSE 76 01/12/2024 0157    BUN 14 01/12/2024 0157    CREATININE 0.86 01/12/2024 0157    CALCIUM 8.8 01/12/2024 0157    ANION 17.0 (H) 01/12/2024 0157       GFR:  Lab Results   Component Value Date/Time    IFAFRICA >60 09/15/2018 0930    IFNOTAFR >60 09/15/2018 0930        TSH:   Lab Results   Component Value Date/Time    TSHULTRASEN 3.290 01/11/2024 0331       MAGNESIUM:  Lab Results   Component Value Date/Time    MAGNESIUM 2.3 01/10/2024 2236        THYROXINE (T4):   No results found for: \"FREEDIR\"     CBC:   Lab Results   Component " "Value Date/Time    WBC 5.0 01/19/2024 04:05 PM    RBC 5.29 01/19/2024 04:05 PM    HEMOGLOBIN 16.6 01/19/2024 04:05 PM    HEMATOCRIT 46.8 01/19/2024 04:05 PM    MCV 88.5 01/19/2024 04:05 PM    MCH 31.4 01/19/2024 04:05 PM    MCHC 35.5 01/19/2024 04:05 PM    RDW 41.3 01/19/2024 04:05 PM    PLATELETCT 19 (LL) 01/19/2024 04:05 PM    MPV 10.9 01/19/2024 04:05 PM    NEUTSPOLYS 66.80 01/19/2024 04:05 PM    LYMPHOCYTES 19.50 (L) 01/19/2024 04:05 PM    MONOCYTES 11.70 01/19/2024 04:05 PM    EOSINOPHILS 1.40 01/19/2024 04:05 PM    BASOPHILS 0.40 01/19/2024 04:05 PM    IMMGRAN 0.20 01/19/2024 04:05 PM    NRBC 0.00 01/19/2024 04:05 PM    NEUTS 3.36 01/19/2024 04:05 PM    LYMPHS 0.98 (L) 01/19/2024 04:05 PM    MONOS 0.59 01/19/2024 04:05 PM    EOS 0.07 01/19/2024 04:05 PM    BASO 0.02 01/19/2024 04:05 PM    IMMGRANAB 0.01 01/19/2024 04:05 PM    NRBCAB 0.00 01/19/2024 04:05 PM        CBC w/o DIFF  Lab Results   Component Value Date/Time    WBC 5.0 01/19/2024 04:05 PM    RBC 5.29 01/19/2024 04:05 PM    HEMOGLOBIN 16.6 01/19/2024 04:05 PM    MCV 88.5 01/19/2024 04:05 PM    MCH 31.4 01/19/2024 04:05 PM    MCHC 35.5 01/19/2024 04:05 PM    RDW 41.3 01/19/2024 04:05 PM    MPV 10.9 01/19/2024 04:05 PM       LIVER:  Lab Results   Component Value Date/Time    ALKPHOSPHAT 67 01/12/2024 01:57 AM    ASTSGOT 26 01/12/2024 01:57 AM    ALTSGPT 30 01/12/2024 01:57 AM    TBILIRUBIN 0.6 01/12/2024 01:57 AM       BNP:  No results found for: \"BNPBTYPENAT\"    PT/INR:  Lab Results   Component Value Date/Time    PROTHROMBTM 13.2 01/10/2024 10:36 PM    PROTHROMBTM 13.6 09/15/2018 09:30 AM    INR 0.99 01/10/2024 10:36 PM    INR 1.05 09/15/2018 09:30 AM             Impression/Plan:  AF (atrial fibrillation) (HCC)   - persistent on monitor; 100% burden   - continue Eliquis 5mg twice daily for stroke protection   - The patients [id number] score is 3. HAS-BLED score is 3   - not ready to do a Watchman at this time and is tolerating OAC just fine   - " continue metoprolol for rate control    - start flecainide 50mg twice daily    - repeat EKG in 2 weeks   - planned cardioversion should still be in AF   - long term use of flecainide will have semi-annual visits with EKG and routine lab work       Essential hypertension, benign   - blood pressure well controlled in office   - continue current regimen                A total of 30 minutes of time was spent on day of encounter reviewing medical record, performing history and examination, counseling, ordering medication/test/consults, collaborating with referring service, and documentation.    [facility]  Cardiac Electrophysiology

## 2024-02-23 NOTE — ASSESSMENT & PLAN NOTE
- persistent on monitor; 100% burden   - continue Eliquis 5mg twice daily for stroke protection   - The patients [id number] score is 3. HAS-BLED score is 3   - not ready to do a Watchman at this time and is tolerating OAC just fine   - continue metoprolol for rate control    - start flecainide 50mg twice daily    - repeat EKG in 2 weeks   - planned cardioversion should still be in AF   - long term use of flecainide will have semi-annual visits with EKG and routine lab work

## 2024-02-26 ENCOUNTER — TELEPHONE (OUTPATIENT)
Dept: CARDIOLOGY | Facility: MEDICAL CENTER | Age: 79
End: 2024-02-26
Payer: COMMERCIAL

## 2024-02-26 NOTE — TELEPHONE ENCOUNTER
MT  Caller: Sophy Norton     Topic[facility]issue: [facility] ; Flecainide (TAMBOCOR) 50 MG tablet .  [facility] is requesting a call for verbal [facility]  [facility]: [phone]     Callback Number:  [phone]     Thank you  Adrianne GEORGE

## 2024-03-08 ENCOUNTER — NON-PROVIDER VISIT (OUTPATIENT)
Dept: CARDIOLOGY | Facility: MEDICAL CENTER | Age: 79
End: 2024-03-08
Attending: NURSE PRACTITIONER
Payer: COMMERCIAL

## 2024-03-08 ENCOUNTER — TELEPHONE (OUTPATIENT)
Dept: CARDIOLOGY | Facility: MEDICAL CENTER | Age: 79
End: 2024-03-08

## 2024-03-08 VITALS
OXYGEN SATURATION: 94 % | WEIGHT: 221 LBS | DIASTOLIC BLOOD PRESSURE: 62 MMHG | SYSTOLIC BLOOD PRESSURE: 114 MMHG | HEART RATE: 67 BPM | BODY MASS INDEX: 32.73 KG/M2 | RESPIRATION RATE: 16 BRPM | HEIGHT: 69 IN

## 2024-03-08 DIAGNOSIS — I48.0 PAROXYSMAL ATRIAL FIBRILLATION (HCC): ICD-10-CM

## 2024-03-08 LAB — EKG IMPRESSION: NORMAL

## 2024-03-08 PROCEDURE — 93005 ELECTROCARDIOGRAM TRACING: CPT

## 2024-03-08 PROCEDURE — 93010 ELECTROCARDIOGRAM REPORT: CPT | Performed by: INTERNAL MEDICINE

## 2024-03-08 ASSESSMENT — FIBROSIS 4 INDEX
FIB4 SCORE: 18.99
FIB4 SCORE: 18.99

## 2024-03-08 NOTE — PROGRESS NOTES
Patient blood pressure WNL.    Preliminary EKG read as atrial fibrillation. Normal rate.     Please advise.

## 2024-03-08 NOTE — PROGRESS NOTES
Patient was here today for blood pressure check per MT. BP readings located in vital sign section. Patient was also here today for EKG per MT. EKG performed and transferred into patients chart. Paper copy of EKG given to Yina BUI. Patient has been informed that Yina BUI, will be in shortly to speak about EKG.     Patient reported feeling overall fine but having slight SOB while walking. Patient also reported taking all medications like prescribed.

## 2024-03-08 NOTE — PROGRESS NOTES
3/8/2024   [id number]  Júnior Norton  1945    Discussed results with patient and his wife as well as planning cardioversion. The risks, benefits, and alternatives to electrical cardioversion were discussed in great detail. We discussed that conversion of atrial fibrillation to normal rhythm, at least transiently, is successful in 90 to 95% of patients. However, maintaining a normal rhythm depends on a number of factors, including underlying heart disease and antiarrhythmic medications. Atrial fibrillation often recurs with time and other treatments may be necessary. Risks of  cardioversion are low as long as anticoagulation issues are handled appropriately. There is a small (less than 1%) risk of embolic events, including stroke. Risks of electrical shock include mild muscle soreness and mild skin burning at the site of electrode placement. There is also a risk that cardioversion can stimulate more dangerous arrhythmias. The patient verbalized understanding of these potential complications and wishes to proceed with this procedure.

## 2024-03-11 NOTE — TELEPHONE ENCOUNTER
Patient is scheduled for a Cardioversion with anesthesia on 04- with Dr. Fish. Patient has been instructed to check in at 7:00 am for 9:00 procedure. No meds to hold. Message sent to authorizations. No device. FYI sent to Shaji Kaur and Shon.

## 2024-03-12 NOTE — TELEPHONE ENCOUNTER
Case scheduled with Rosalina. Paperwork emailed to [facility] lab scheduling.

## 2024-03-22 ENCOUNTER — APPOINTMENT (OUTPATIENT)
Dept: ADMISSIONS | Facility: MEDICAL CENTER | Age: 79
End: 2024-03-22
Attending: INTERNAL MEDICINE
Payer: COMMERCIAL

## 2024-03-29 ENCOUNTER — PRE-ADMISSION TESTING (OUTPATIENT)
Dept: ADMISSIONS | Facility: MEDICAL CENTER | Age: 79
End: 2024-03-29
Attending: INTERNAL MEDICINE
Payer: COMMERCIAL

## 2024-03-29 RX ORDER — MAGNESIUM OXIDE 400 MG/1
400 TABLET ORAL DAILY
COMMUNITY

## 2024-04-02 ENCOUNTER — PRE-ADMISSION TESTING (OUTPATIENT)
Dept: ADMISSIONS | Facility: MEDICAL CENTER | Age: 79
End: 2024-04-02
Attending: INTERNAL MEDICINE
Payer: COMMERCIAL

## 2024-04-02 DIAGNOSIS — Z01.812 PRE-OPERATIVE LABORATORY EXAMINATION: ICD-10-CM

## 2024-04-02 LAB
ALBUMIN SERPL BCP-MCNC: 4.4 G/DL (ref 3.2–4.9)
ALBUMIN/GLOB SERPL: 1.7 G/DL
ALP SERPL-CCNC: 69 U/L (ref 30–99)
ALT SERPL-CCNC: 14 U/L (ref 2–50)
ANION GAP SERPL CALC-SCNC: 11 MMOL/L (ref 7–16)
AST SERPL-CCNC: 17 U/L (ref 12–45)
BILIRUB SERPL-MCNC: 0.4 MG/DL (ref 0.1–1.5)
BUN SERPL-MCNC: 17 MG/DL (ref 8–22)
CALCIUM ALBUM COR SERPL-MCNC: 8.4 MG/DL (ref 8.5–10.5)
CALCIUM SERPL-MCNC: 8.7 MG/DL (ref 8.5–10.5)
CHLORIDE SERPL-SCNC: 104 MMOL/L (ref 96–112)
CO2 SERPL-SCNC: 24 MMOL/L (ref 20–33)
CREAT SERPL-MCNC: 1.05 MG/DL (ref 0.5–1.4)
GFR SERPLBLD CREATININE-BSD FMLA CKD-EPI: 73 ML/MIN/1.73 M 2
GLOBULIN SER CALC-MCNC: 2.6 G/DL (ref 1.9–3.5)
GLUCOSE SERPL-MCNC: 114 MG/DL (ref 65–99)
INR PPP: 1.11 (ref 0.87–1.13)
POTASSIUM SERPL-SCNC: 4.4 MMOL/L (ref 3.6–5.5)
PROT SERPL-MCNC: 7 G/DL (ref 6–8.2)
PROTHROMBIN TIME: 14.5 SEC (ref 12–14.6)
SODIUM SERPL-SCNC: 139 MMOL/L (ref 135–145)

## 2024-04-02 PROCEDURE — 85610 PROTHROMBIN TIME: CPT

## 2024-04-02 PROCEDURE — 80053 COMPREHEN METABOLIC PANEL: CPT

## 2024-04-02 PROCEDURE — 36415 COLL VENOUS BLD VENIPUNCTURE: CPT

## 2024-04-04 ENCOUNTER — ANESTHESIA EVENT (OUTPATIENT)
Dept: CARDIOLOGY | Facility: MEDICAL CENTER | Age: 79
End: 2024-04-04
Payer: COMMERCIAL

## 2024-04-05 ENCOUNTER — ANESTHESIA (OUTPATIENT)
Dept: CARDIOLOGY | Facility: MEDICAL CENTER | Age: 79
End: 2024-04-05
Payer: COMMERCIAL

## 2024-04-05 ENCOUNTER — HOSPITAL ENCOUNTER (OUTPATIENT)
Facility: MEDICAL CENTER | Age: 79
End: 2024-04-05
Attending: INTERNAL MEDICINE | Admitting: INTERNAL MEDICINE
Payer: COMMERCIAL

## 2024-04-05 ENCOUNTER — TELEPHONE (OUTPATIENT)
Dept: CARDIOLOGY | Facility: MEDICAL CENTER | Age: 79
End: 2024-04-05

## 2024-04-05 VITALS
HEIGHT: 69 IN | OXYGEN SATURATION: 93 % | DIASTOLIC BLOOD PRESSURE: 86 MMHG | RESPIRATION RATE: 18 BRPM | TEMPERATURE: 96.5 F | WEIGHT: 221.34 LBS | HEART RATE: 66 BPM | BODY MASS INDEX: 32.78 KG/M2 | SYSTOLIC BLOOD PRESSURE: 161 MMHG

## 2024-04-05 DIAGNOSIS — D69.6 THROMBOCYTOPENIA (HCC): ICD-10-CM

## 2024-04-05 DIAGNOSIS — I48.0 PAROXYSMAL ATRIAL FIBRILLATION (HCC): ICD-10-CM

## 2024-04-05 LAB
EKG IMPRESSION: NORMAL
ERYTHROCYTE [DISTWIDTH] IN BLOOD BY AUTOMATED COUNT: NORMAL FL (ref 35.9–50)
EXTRA TUBE BLU BLU: NORMAL
HCT VFR BLD AUTO: NORMAL % (ref 42–52)
HGB BLD-MCNC: NORMAL G/DL (ref 14–18)
MCH RBC QN AUTO: NORMAL PG (ref 27–33)
MCHC RBC AUTO-ENTMCNC: NORMAL G/DL (ref 32.3–36.5)
MCV RBC AUTO: NORMAL FL (ref 81.4–97.8)
PLATELET # BLD AUTO: NORMAL K/UL (ref 164–446)
PLATELETS.RETICULATED NFR BLD AUTO: NORMAL % (ref 0.6–13.1)
PMV BLD AUTO: NORMAL FL (ref 9–12.9)
RBC # BLD AUTO: NORMAL M/UL (ref 4.7–6.1)
WBC # BLD AUTO: NORMAL K/UL (ref 4.8–10.8)

## 2024-04-05 PROCEDURE — 85027 COMPLETE CBC AUTOMATED: CPT

## 2024-04-05 PROCEDURE — 85055 RETICULATED PLATELET ASSAY: CPT

## 2024-04-05 PROCEDURE — 93010 ELECTROCARDIOGRAM REPORT: CPT | Performed by: INTERNAL MEDICINE

## 2024-04-05 PROCEDURE — 36415 COLL VENOUS BLD VENIPUNCTURE: CPT

## 2024-04-05 PROCEDURE — 93005 ELECTROCARDIOGRAM TRACING: CPT | Performed by: INTERNAL MEDICINE

## 2024-04-05 RX ORDER — SODIUM CHLORIDE, SODIUM LACTATE, POTASSIUM CHLORIDE, CALCIUM CHLORIDE 600; 310; 30; 20 MG/100ML; MG/100ML; MG/100ML; MG/100ML
INJECTION, SOLUTION INTRAVENOUS CONTINUOUS
Status: DISCONTINUED | OUTPATIENT
Start: 2024-04-05 | End: 2024-04-05 | Stop reason: HOSPADM

## 2024-04-05 ASSESSMENT — FIBROSIS 4 INDEX: FIB4 SCORE: 18.17

## 2024-04-05 NOTE — PROGRESS NOTES
"Reviewed patient chart. Ongoing thrombocytopenia without hematology eval thus far. Adherent to Apixaban without recurrent bleeding concerns. Plt count < 50K.    Discussed with him and his wife these options:  1) no DCCV, see hematology first and get their reassurance before proceeding with rhythm control strategies for A fib given possible need to stop Apixaban or AC. Patient reports feeling \"fine with the A fib\".  2) proceed as planned understanding the high risk of CVA if AC is stopped sooner than 4 weeks post DCCV    Anesthesiologist at bedside.    We agreed on option 1 and they were appreciative of the discussion. Stat / urgent hematology referral placed and referring cardiology provider notified. Anesthesiologist also agrees.  "

## 2024-04-05 NOTE — TELEPHONE ENCOUNTER
MT    Caller: Sophy - Wife    Topic/issue: Patient was due for cardioversion this morning but it was cancelled due to result of lab drawn this morning. Wife states they were told to call and have Shon Fung put in order for new lab draw so they can go back and complete that today. The wrong lab was ordered. Please advise.    Callback Number: [phone] (home)      Thank you,  Dayana RICHARDSON

## 2024-04-05 NOTE — PROGRESS NOTES
"0900- [place] RN notified me that case was cx due to low platelets. See Dr Fish note.  0910 Dr Robertson states that she spoke to lab, and pt needs another blue top drawn for a \"Citrated\" platelet count.   0930 Blue top sent to lab, RN called to verify blue top was received and  stated that purple top was needed at the same time.     "

## 2024-04-05 NOTE — TELEPHONE ENCOUNTER
KAREN Douglas R.N.  Could you please call the hematology office and get him in stat. There continues to be a big variance between how his platelets come out depending on citrated vs non-citrated levels. DCCV was held today by Olman due to this          Previous Messages       ----- Message -----  From: Olman Fish M.D.  Sent: 4/5/2024   9:09 AM PDT  To: KAREN Douglas  Subject: Inpatient Notes                                  Can you please help them get into hematology asap to get this sorted out?      ---------------------------------------------------------------------------------------------------  Phone Number Called: [phone]    Call outcome: Spoke to patient regarding message below.    Message: Called to inform patient of AL/MT recommendations. Patient was told by the lab that the problem is that he has an antibody that causes inaccurate platelet counts on the normal tube and that a special lab tube needs to be used. AL/MT recommending confirmation from hematology first. Upon chart review previous referral to heme was completed due [facility]. Confirmation requested from patient if he saw a [facility] with the [facility]. Patient states no and he and wife would prefer to see non [facility] hematologist. Will send message to referral team as an FYI and to hematology.     To MT/AL: Received return call from patient wife. States she talked with Evelyn in surgery that said that it was recommended for patient to get a new set of labs on a normal tube to be present for hematology as well as the Citrated labs that were ordered. Please advise if ok to order this for patient to complete.

## 2024-04-05 NOTE — ANESTHESIA PREPROCEDURE EVALUATION
Date/Time: 04/05/24 0900    Scheduled providers: Olman Fish M.D.; Elzbieta Robertson M.D.    Procedure: CL-CARDIOVERSION    Diagnosis: Paroxysmal atrial fibrillation (HCC) [I48.0]    Indications: See Associated Dx    Location: [facility]          79 yo M with history of HTN, persistent afib, asthma and hypothyroidism here for cardioversion. Severely thrombocytopenic; last plt count 19 in Jan 2024; repeat 30.     NPO  Relevant Problems   CARDIAC   (positive) AF (atrial fibrillation) (HCC)   (positive) Essential hypertension, benign   (positive) Hypertensive urgency      ENDO   (positive) Hypothyroidism      Other   (positive) Thrombocytopenia (HCC)       Physical Exam    Airway   Mallampati: II  TM distance: >3 FB  Neck ROM: full       Cardiovascular - normal exam  Rhythm: regular  Rate: normal  (-) murmur     Dental - normal exam           Pulmonary - normal exam  Breath sounds clear to auscultation     Abdominal    Neurological - normal exam                   Anesthesia Plan    ASA 3   ASA physical status 3 criteria: a thrombophilic disease requiring anticoagulation    Plan - MAC               Induction: intravenous      Pertinent diagnostic labs and testing reviewed    Informed Consent:    Anesthetic plan and risks discussed with patient and spouse.

## 2024-04-08 NOTE — TELEPHONE ENCOUNTER
Per referral note -   Per Dr Mccoy, schedule with Dr Mcgowan or JENNIFER Lujan    Will follow up tomorrow.

## 2024-04-09 ENCOUNTER — HOSPITAL ENCOUNTER (OUTPATIENT)
Dept: HEMATOLOGY ONCOLOGY | Facility: MEDICAL CENTER | Age: 79
End: 2024-04-09
Attending: STUDENT IN AN ORGANIZED HEALTH CARE EDUCATION/TRAINING PROGRAM
Payer: MEDICARE

## 2024-04-09 ENCOUNTER — HOSPITAL ENCOUNTER (OUTPATIENT)
Dept: LAB | Facility: MEDICAL CENTER | Age: 79
End: 2024-04-09
Attending: STUDENT IN AN ORGANIZED HEALTH CARE EDUCATION/TRAINING PROGRAM
Payer: COMMERCIAL

## 2024-04-09 VITALS
SYSTOLIC BLOOD PRESSURE: 130 MMHG | WEIGHT: 220.79 LBS | DIASTOLIC BLOOD PRESSURE: 74 MMHG | TEMPERATURE: 97.7 F | HEIGHT: 69 IN | OXYGEN SATURATION: 95 % | HEART RATE: 63 BPM | BODY MASS INDEX: 32.7 KG/M2

## 2024-04-09 DIAGNOSIS — D69.6 THROMBOCYTOPENIA (HCC): ICD-10-CM

## 2024-04-09 LAB
BASOPHILS # BLD AUTO: 0.8 % (ref 0–1.8)
BASOPHILS # BLD: 0.04 K/UL (ref 0–0.12)
EOSINOPHIL # BLD AUTO: 0.08 K/UL (ref 0–0.51)
EOSINOPHIL NFR BLD: 1.6 % (ref 0–6.9)
ERYTHROCYTE [DISTWIDTH] IN BLOOD BY AUTOMATED COUNT: 43.7 FL (ref 35.9–50)
HCT VFR BLD AUTO: 44.9 % (ref 42–52)
HGB BLD-MCNC: 15.2 G/DL (ref 14–18)
IMM GRANULOCYTES # BLD AUTO: 0.01 K/UL (ref 0–0.11)
IMM GRANULOCYTES NFR BLD AUTO: 0.2 % (ref 0–0.9)
LYMPHOCYTES # BLD AUTO: 1.03 K/UL (ref 1–4.8)
LYMPHOCYTES NFR BLD: 20.6 % (ref 22–41)
MCH RBC QN AUTO: 30.8 PG (ref 27–33)
MCHC RBC AUTO-ENTMCNC: 33.9 G/DL (ref 32.3–36.5)
MCV RBC AUTO: 90.9 FL (ref 81.4–97.8)
MONOCYTES # BLD AUTO: 0.44 K/UL (ref 0–0.85)
MONOCYTES NFR BLD AUTO: 8.8 % (ref 0–13.4)
NEUTROPHILS # BLD AUTO: 3.39 K/UL (ref 1.82–7.42)
NEUTROPHILS NFR BLD: 68 % (ref 44–72)
NRBC # BLD AUTO: 0 K/UL
NRBC BLD-RTO: 0 /100 WBC (ref 0–0.2)
PLATELET # BLD AUTO: 125 K/UL (ref 164–446)
PLATELET # BLD AUTO: 44 K/UL (ref 164–446)
PLATELET BLD QL SMEAR: NORMAL
PLATELETS.RETICULATED NFR BLD AUTO: 17 % (ref 0.6–13.1)
PMV BLD AUTO: 11.6 FL (ref 9–12.9)
RBC # BLD AUTO: 4.94 M/UL (ref 4.7–6.1)
WBC # BLD AUTO: 5 K/UL (ref 4.8–10.8)

## 2024-04-09 PROCEDURE — 85025 COMPLETE CBC W/AUTO DIFF WBC: CPT

## 2024-04-09 PROCEDURE — 99212 OFFICE O/P EST SF 10 MIN: CPT | Performed by: STUDENT IN AN ORGANIZED HEALTH CARE EDUCATION/TRAINING PROGRAM

## 2024-04-09 PROCEDURE — 99204 OFFICE O/P NEW MOD 45 MIN: CPT | Performed by: STUDENT IN AN ORGANIZED HEALTH CARE EDUCATION/TRAINING PROGRAM

## 2024-04-09 PROCEDURE — 36415 COLL VENOUS BLD VENIPUNCTURE: CPT

## 2024-04-09 PROCEDURE — 85055 RETICULATED PLATELET ASSAY: CPT

## 2024-04-09 ASSESSMENT — ENCOUNTER SYMPTOMS
MEMORY LOSS: 0
HEARTBURN: 0
TINGLING: 0
ABDOMINAL PAIN: 0
HEADACHES: 0
TREMORS: 0
NECK PAIN: 0
ORTHOPNEA: 0
SENSORY CHANGE: 0
FOCAL WEAKNESS: 0
FEVER: 0
DEPRESSION: 0
COUGH: 0
BRUISES/BLEEDS EASILY: 0
WEIGHT LOSS: 0
SORE THROAT: 0
SPUTUM PRODUCTION: 0
NAUSEA: 0
WHEEZING: 0
VOMITING: 0
CHILLS: 0
BLURRED VISION: 0
SHORTNESS OF BREATH: 0
PALPITATIONS: 0
DIZZINESS: 0

## 2024-04-09 NOTE — PROGRESS NOTES
Consult Note: Hematology/Oncology     Primary Care:  Loida Hilliard M.D.    Chief Complaint   Patient presents with    New Patient     Thrombocytopenia        Current Treatment: None    Prior Treatment: None    Subjective:   History of Presenting Illness:  Júnior Norton is a 78 y.o. male who presents with thrombocytopenia.     Patient reports that this started in January of 2024.  He had hematuria and was passing clots. He was told this was 2/2 to [facility].  During this ER visit, he was found to have Afib.  He was seen cardiologist (Dr. Mcgowan).  The plan is to cardiovert but his platelet count is low.     He remains on eliquis.  He has not missed a dose.     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  His lab tests have not been done in a citrated tube.     Past Medical History:   Diagnosis Date    Arrhythmia 03/29/2024    afib    Asthma     as a child    Breath shortness     Cataract     bilat IOL    Depression     Hemorrhagic disorder (HCC) 01/10/2024    hematuria (due to Aleve use)    High cholesterol     Hyperlipemia     Hypertension     Hypothyroid     Pain     left knee        Past Surgical History:   Procedure Laterality Date    OTHER Bilateral 2021    vitrectomy both eyes    SUBMANDIBLE ABSCESS INCISION AND DRAINAGE Left 09/15/2018    Procedure: SUBMANDIBLE ABSCESS INCISION AND DRAINAGE;  Surgeon: Adarsh Gee M.D.;  Location: SURGERY [facility];  Service: Dental    DENTAL EXTRACTION(S) Left 09/15/2018    Procedure: DENTAL EXTRACTION(S) #18;  Surgeon: Adarsh Gee M.D.;  Location: SURGERY [facility];  Service: Dental    CATARACT EXTRACTION WITH IOL Bilateral     OTHER      OTHER      TURP    OTHER ORTHOPEDIC SURGERY Bilateral     knees (ACL and meniscus)       Social History     Tobacco Use    Smoking status: Former     Current packs/day: 0.00     Average packs/day: 1 pack/day for 10.0 years (10.0 ttl pk-yrs)     Types: Cigarettes     Start date:       Quit date:      Years since quittin.3    Smokeless tobacco: Never   Vaping Use    Vaping Use: Never used   Substance Use Topics    Alcohol use: Not Currently    Drug use: Never        No family history on file.    No Known Allergies    Current Outpatient Medications   Medication Sig Dispense Refill    OMEGA-3 FATTY ACIDS PO Take 1 Tablet by mouth every day.      magnesium oxide (MAG-OX) 400 MG Tab tablet Take 400 mg by mouth every day.      finasteride (PROSCAR) 5 MG Tab Take 1 Tablet by mouth every day.      flecainide (TAMBOCOR) 50 MG tablet Take 1 Tablet by mouth 2 times a day. 60 Tablet 3    apixaban (ELIQUIS) 5mg Tab Take 1 Tablet by mouth 2 times a day. 180 Tablet 3    Multiple Vitamins-Minerals (PRESERVISION AREDS PO) Take 1 Tablet by mouth 2 times a day.      Multiple Vitamins-Minerals (CENTRUM SILVER 50+MEN PO) Take 1 Tablet by mouth every day.      levothyroxine (SYNTHROID) 125 MCG Tab Take 125 mcg by mouth every morning on an empty stomach.      amLODIPine (NORVASC) 10 MG Tab Take 10 mg by mouth every day.      metoprolol SR (TOPROL XL) 100 MG TABLET SR 24 HR Take 100 mg by mouth every day.      lisinopril (PRINIVIL, ZESTRIL) 40 MG tablet Take 40 mg by mouth every day.      atorvastatin (LIPITOR) 20 MG Tab Take 20 mg by mouth every evening.      mirtazapine (REMERON) 45 MG tablet Take 45 mg by mouth every evening.      vitamin D (CHOLECALCIFEROL) 1000 UNIT Tab Take 1,000 Units by mouth every day.       No current facility-administered medications for this encounter.       Review of Systems   Constitutional:  Negative for chills, fever, malaise/fatigue and weight loss.   HENT:  Negative for congestion, ear pain, nosebleeds and sore throat.    Eyes:  Negative for blurred vision.   Respiratory:  Negative for cough, sputum production, shortness of breath and wheezing.    Cardiovascular:  Negative for chest pain, palpitations, orthopnea and leg swelling.   Gastrointestinal:  Negative for  "abdominal pain, heartburn, nausea and vomiting.   Genitourinary:  Negative for dysuria, frequency and urgency.   Musculoskeletal:  Negative for neck pain.   Neurological:  Negative for dizziness, tingling, tremors, sensory change, focal weakness and headaches.   Endo/Heme/Allergies:  Does not bruise/bleed easily.   Psychiatric/Behavioral:  Negative for depression, memory loss and suicidal ideas.    All other systems reviewed and are negative.      Problem list, medications, and allergies reviewed by myself today in Epic.     Objective:     Vitals:    04/09/24 0853   BP: 130/74   BP Location: Left arm   Patient Position: Sitting   BP Cuff Size: Adult   Pulse: 63   Temp: 36.5 °C (97.7 °F)   TempSrc: Temporal   SpO2: 95%   Weight: 100 kg (220 lb 12.7 oz)   Height: 1.753 m (5' 9.02\")       DESC; KARNOFSKY SCALE WITH ECOG EQUIVALENT: 100, Fully active, able to carry on all pre-disease performed without restriction (ECOG equivalent 0)    DISTRESS LEVEL: no acute distress    Physical Exam  Deferred    Labs:   Most recent labs reviewed.      Thrombocytopenia        Assessment/Plan:     Mr. Norton is a 79 yo M who presents today with thrombocytopenia.    Patient informing that he has known platelet clumping when his CBC has been running in an EDTA tube.    Splane to the patient although he already knew that this is an ex vivo phenomenon due to EDTA dependent antibodies and therefore his CBC should be run in a citrate tube.    Because of his upcoming procedure with cardiology he needs to demonstrate that he does not have anything wrong with his platelets but rather this is secondary to platelet clumping.  This has no clinical consequence of them preventing instruments from properly counting blood platelets.    As such I will order to test: I will order a CBC and an EDTA tube and then a CBC and a citrated tube to prove that his EDTA dependent antibodies because clumping in EDTA tube.  I will also look at the smear and " visualized clumping myself.    As such patient should always have his CBC run in a citrated tube      Plan  Repeat CBC with differential and smear in both EDTA tube and a citrated tube  Will contact the patient via [employer] based on the results      Any questions and concerns raised by the patient were addressed and answered. Patient denies any further questions.  Patient encouraged to call the office with any concerns or issues.     Adriana Mcgowan M.D.  Hematology/Oncology      ADDENDUM: Patient's platelet count was 44 and an EDTA tube and in a citrated tube was 125.  I can confirm platelet clumping on smear.  As such it is important that the patient always has his CBC drawn in a citrated tube.  I called patient to discuss these results.

## 2024-04-15 ENCOUNTER — TELEPHONE (OUTPATIENT)
Dept: CARDIOLOGY | Facility: MEDICAL CENTER | Age: 79
End: 2024-04-15
Payer: COMMERCIAL

## 2024-04-15 NOTE — TELEPHONE ENCOUNTER
----- Message from KAREN Douglas sent at 4/15/2024 12:16 PM PDT -----  Regarding: RE: plt issue  If he is up for it and has not stopped I will just contact Lorena and Olman and set it up   ----- Message -----  From: Kristin Mckenzie R.N.  Sent: 4/15/2024  12:06 PM PDT  To: KAREN Douglas  Subject: RE: plt issue                                    This would have to be scheduled still?   I will reach out to patient to see if he has stopped Eliquis first.   ----- Message -----  From: KAREN Douglas  Sent: 4/15/2024  11:57 AM PDT  To: Kristin Mckenzie R.N.  Subject: FW: plt issue                                    Please let him know if he has not stopped eliquis he can get re cardioverted friday per Dr. Fish  ----- Message -----  From: Adriana Mcgowan M.D.  Sent: 4/15/2024  11:23 AM PDT  To: Olman Fish M.D.; #  Subject: RE: plt issue                                    Hello,    No contraindication from a platelet standpoint.  Thanks for checking  ----- Message -----  From: Olman Fish M.D.  Sent: 4/15/2024  11:04 AM PDT  To: Adriana Mcgowan M.D.; #  Subject: plt issue                                        Following up on this patient:    Adriana: any final input regarding his thrombocytopenia after recent labs ? If reassuring and you give us the green light, Shon, I am back in the lab this Friday if you still need a cardioversion while he is adherent on Apixaban.    Olman

## 2024-04-15 NOTE — TELEPHONE ENCOUNTER
S/w patient, confirmed he is still taking Eliquis and would like to continue with cardioversion.   Discussed in person with MT regarding information received.   MT to reach out to Timoteo.

## 2024-04-19 ENCOUNTER — HOSPITAL ENCOUNTER (OUTPATIENT)
Facility: MEDICAL CENTER | Age: 79
End: 2024-04-19
Attending: INTERNAL MEDICINE | Admitting: INTERNAL MEDICINE
Payer: COMMERCIAL

## 2024-04-19 ENCOUNTER — APPOINTMENT (OUTPATIENT)
Dept: CARDIOLOGY | Facility: MEDICAL CENTER | Age: 79
End: 2024-04-19
Attending: NURSE PRACTITIONER
Payer: COMMERCIAL

## 2024-04-19 ENCOUNTER — ANESTHESIA (OUTPATIENT)
Dept: CARDIOLOGY | Facility: MEDICAL CENTER | Age: 79
End: 2024-04-19
Payer: COMMERCIAL

## 2024-04-19 ENCOUNTER — ANESTHESIA EVENT (OUTPATIENT)
Dept: CARDIOLOGY | Facility: MEDICAL CENTER | Age: 79
End: 2024-04-19
Payer: COMMERCIAL

## 2024-04-19 VITALS
BODY MASS INDEX: 32.59 KG/M2 | RESPIRATION RATE: 16 BRPM | TEMPERATURE: 96.7 F | WEIGHT: 220.02 LBS | DIASTOLIC BLOOD PRESSURE: 70 MMHG | OXYGEN SATURATION: 92 % | HEIGHT: 69 IN | SYSTOLIC BLOOD PRESSURE: 119 MMHG | HEART RATE: 52 BPM

## 2024-04-19 LAB
EKG IMPRESSION: NORMAL
EKG IMPRESSION: NORMAL

## 2024-04-19 PROCEDURE — 700105 HCHG RX REV CODE 258: Performed by: ANESTHESIOLOGY

## 2024-04-19 PROCEDURE — 93005 ELECTROCARDIOGRAM TRACING: CPT | Performed by: INTERNAL MEDICINE

## 2024-04-19 PROCEDURE — 93010 ELECTROCARDIOGRAM REPORT: CPT | Mod: 59 | Performed by: INTERNAL MEDICINE

## 2024-04-19 PROCEDURE — 700111 HCHG RX REV CODE 636 W/ 250 OVERRIDE (IP): Performed by: ANESTHESIOLOGY

## 2024-04-19 PROCEDURE — 700101 HCHG RX REV CODE 250: Performed by: ANESTHESIOLOGY

## 2024-04-19 PROCEDURE — 160046 HCHG PACU - 1ST 60 MINS PHASE II

## 2024-04-19 PROCEDURE — 4410588 CL-CARDIOVERSION

## 2024-04-19 PROCEDURE — 93010 ELECTROCARDIOGRAM REPORT: CPT | Mod: 59,76 | Performed by: INTERNAL MEDICINE

## 2024-04-19 PROCEDURE — 92960 CARDIOVERSION ELECTRIC EXT: CPT | Performed by: INTERNAL MEDICINE

## 2024-04-19 PROCEDURE — 160035 HCHG PACU - 1ST 60 MINS PHASE I

## 2024-04-19 PROCEDURE — 160002 HCHG RECOVERY MINUTES (STAT)

## 2024-04-19 RX ORDER — SODIUM CHLORIDE, SODIUM LACTATE, POTASSIUM CHLORIDE, CALCIUM CHLORIDE 600; 310; 30; 20 MG/100ML; MG/100ML; MG/100ML; MG/100ML
INJECTION, SOLUTION INTRAVENOUS CONTINUOUS
Status: DISCONTINUED | OUTPATIENT
Start: 2024-04-19 | End: 2024-04-19 | Stop reason: HOSPADM

## 2024-04-19 RX ORDER — ONDANSETRON 2 MG/ML
4 INJECTION INTRAMUSCULAR; INTRAVENOUS
Status: DISCONTINUED | OUTPATIENT
Start: 2024-04-19 | End: 2024-04-19 | Stop reason: HOSPADM

## 2024-04-19 RX ORDER — SODIUM CHLORIDE, SODIUM LACTATE, POTASSIUM CHLORIDE, CALCIUM CHLORIDE 600; 310; 30; 20 MG/100ML; MG/100ML; MG/100ML; MG/100ML
INJECTION, SOLUTION INTRAVENOUS
Status: DISCONTINUED | OUTPATIENT
Start: 2024-04-19 | End: 2024-04-19 | Stop reason: SURG

## 2024-04-19 RX ORDER — DIPHENHYDRAMINE HYDROCHLORIDE 50 MG/ML
12.5 INJECTION INTRAMUSCULAR; INTRAVENOUS
Status: DISCONTINUED | OUTPATIENT
Start: 2024-04-19 | End: 2024-04-19 | Stop reason: HOSPADM

## 2024-04-19 RX ORDER — LIDOCAINE HYDROCHLORIDE 20 MG/ML
INJECTION, SOLUTION EPIDURAL; INFILTRATION; INTRACAUDAL; PERINEURAL PRN
Status: DISCONTINUED | OUTPATIENT
Start: 2024-04-19 | End: 2024-04-19 | Stop reason: SURG

## 2024-04-19 RX ORDER — HALOPERIDOL 5 MG/ML
1 INJECTION INTRAMUSCULAR
Status: DISCONTINUED | OUTPATIENT
Start: 2024-04-19 | End: 2024-04-19 | Stop reason: HOSPADM

## 2024-04-19 RX ADMIN — PROPOFOL 50 MG: 10 INJECTION, EMULSION INTRAVENOUS at 10:15

## 2024-04-19 RX ADMIN — SODIUM CHLORIDE, POTASSIUM CHLORIDE, SODIUM LACTATE AND CALCIUM CHLORIDE: 600; 310; 30; 20 INJECTION, SOLUTION INTRAVENOUS at 10:08

## 2024-04-19 RX ADMIN — LIDOCAINE HYDROCHLORIDE 100 MG: 20 INJECTION, SOLUTION EPIDURAL; INFILTRATION; INTRACAUDAL at 10:15

## 2024-04-19 ASSESSMENT — PAIN SCALES - GENERAL: PAIN_LEVEL: 0

## 2024-04-19 ASSESSMENT — PAIN DESCRIPTION - PAIN TYPE: TYPE: SURGICAL PAIN

## 2024-04-19 ASSESSMENT — FIBROSIS 4 INDEX: FIB4 SCORE: 7.85

## 2024-04-19 NOTE — PROCEDURES
Procedure Performed: [facility]    Procedure physician: Olman Fish MD, MPH  Assistant: None    Clearance re his low platelets obtained from Hematology before proceeding. Adherent to his BID Apixaban since Jan 2024.    CONSENT: I have discussed the risks and benefits of electrical cardioversion as well as the procedure itself, rationale and appropriateness in detail with the patient today. Complications including but not limited to death, stroke, MI, ACLS, aspiration and complications related to anesthesia were explained to the patient. The potential outcomes associated with the procedure were also discussed at length. The patient agrees to proceed.    Pre-procedure diagnosis/Indication: Atrial fibrillation - persistent  Post-procedure diagnosis: sinus bradycardia with PACs    Procedure description: After confirmation of therapeutic anticoagulation and obtaining informed consent, the patient was deeply sedated with propofol by my anesthesia colleague.  . I then delivered a synchronized 200 joule biphasic cardioversion pulse in the anterior-posterior vector which  successfully restored sinus rhythm. The patient awoke from sedation without complication.     Specimens: None  EBL: None  Complications: None    Impression  Successful cardioversion with restoration of sinus rhythm from Atrial fibrillation    Olamn Fish MD, MPH [facility]  Interventional Cardiologist  [facility]

## 2024-04-19 NOTE — DISCHARGE INSTRUCTIONS
Electrical Cardioversion, Care After  This sheet gives you information about how to care for yourself after your procedure. Your health care provider may also give you more specific instructions. If you have problems or questions, contact your health care provider.  What can I expect after the procedure?  After the procedure, it is common to have:  Some redness on the skin where the shocks were given.  Follow these instructions at home:  Do not drive for 24 hours if you were given a medicine to help you relax (sedative).  Take over-the-counter and prescription medicines only as told by your health care provider.  Ask your health care provider how to check your pulse. Check it often.  Rest for 48 hours after the procedure or as told by your health care provider.  Avoid or limit your caffeine use as told by your health care provider.  Contact a health care provider if:  You feel like your heart is beating too quickly or your pulse is not regular.  You have a serious muscle cramp that does not go away.  Get help right away if:    You have discomfort in your chest.  You are dizzy or you feel faint.  You have trouble breathing or you are short of breath.  Your speech is slurred.  You have trouble moving an arm or leg on one side of your body.  Your fingers or toes turn cold or blue.  This information is not intended to replace advice given to you by your health care provider. Make sure you discuss any questions you have with your health care provider.  Document Released: 10/08/2014 Document Revised: 11/30/2018 Document Reviewed: 06/23/2017  Elsevier Patient Education © 2020 [employer]

## 2024-04-19 NOTE — ANESTHESIA POSTPROCEDURE EVALUATION
Patient: Júnior Norton    Procedure Summary       Date: 04/19/24 Room / Location: [facility] - Echocardiology [facility]    Anesthesia Start: 1008 Anesthesia Stop: 1030    Procedure: CL-CARDIOVERSION Diagnosis:       Paroxysmal atrial fibrillation (HCC)      (See Associated Dx)    Scheduled Providers: Olman Fish M.D.; Qamar Colon M.D. Responsible Provider: Qamar Colon M.D.    Anesthesia Type: general ASA Status: 3            Final Anesthesia Type: general  Last vitals  BP   Blood Pressure : (!) 155/91    Temp   35.9 °C (96.7 °F)    Pulse   75   Resp   16    SpO2   95 %      Anesthesia Post Evaluation    Patient location during evaluation: PACU  Patient participation: complete - patient participated  Level of consciousness: awake  Pain score: 0    Airway patency: patent  Anesthetic complications: no  Cardiovascular status: hemodynamically stable  Respiratory status: acceptable  Hydration status: balanced    PONV: none          There were no known notable events for this encounter.     Nurse Pain Score: 0 (NPRS)

## 2024-04-19 NOTE — ANESTHESIA PREPROCEDURE EVALUATION
Date/Time: 04/19/24 1000    Scheduled providers: Olman Fish M.D.; Qamar Colon M.D.    Procedure: CL-CARDIOVERSION    Diagnosis: Paroxysmal atrial fibrillation (HCC) [I48.0]    Indications: See Associated Dx    Location: [facility] - Echocardiology [facility]            Past Medical History:   Diagnosis Date    Arrhythmia 03/29/2024    afib    Asthma     as a child    Breath shortness     Cataract     bilat IOL    Depression     Hemorrhagic disorder (HCC) 01/10/2024    hematuria (due to Aleve use)    High cholesterol     Hyperlipemia     Hypertension     Hypothyroid     Pain     left knee       Past Surgical History:   Procedure Laterality Date    OTHER Bilateral 2021    vitrectomy both eyes    SUBMANDIBLE ABSCESS INCISION AND DRAINAGE Left 09/15/2018    Procedure: SUBMANDIBLE ABSCESS INCISION AND DRAINAGE;  Surgeon: Adarsh Gee M.D.;  Location: SURGERY [facility];  Service: Dental    DENTAL EXTRACTION(S) Left 09/15/2018    Procedure: DENTAL EXTRACTION(S) #18;  Surgeon: Adarsh Gee M.D.;  Location: [facility];  Service: Dental    CATARACT EXTRACTION WITH IOL Bilateral     OTHER      OTHER      TURP    OTHER ORTHOPEDIC SURGERY Bilateral     knees (ACL and meniscus)       Current Outpatient Medications   Medication Instructions    amLODIPine (NORVASC) 10 mg, Oral, DAILY    apixaban (ELIQUIS) 5 mg, Oral, 2 TIMES DAILY    atorvastatin (LIPITOR) 20 mg, Oral, NIGHTLY    finasteride (PROSCAR) 5 MG Tab 1 Tablet, Oral, EVERY DAY    flecainide (TAMBOCOR) 50 mg, Oral, 2 TIMES DAILY    levothyroxine (SYNTHROID) 125 mcg, Oral, EACH MORNING ON EMPTY STOMACH    lisinopril (PRINIVIL) 40 mg, Oral, DAILY    magnesium oxide (MAG-OX) 400 mg, Oral, DAILY    metoprolol SR (TOPROL XL) 100 mg, Oral, DAILY    mirtazapine (REMERON) 45 mg, Oral, NIGHTLY    Multiple Vitamins-Minerals (CENTRUM SILVER 50+MEN PO) 1 Tablet, Oral, DAILY    Multiple Vitamins-Minerals (PRESERVISION AREDS PO) 1  "Tablet, Oral, 2 TIMES DAILY    OMEGA-3 FATTY ACIDS PO 1 Tablet, Oral, DAILY    vitamin D (CHOLECALCIFEROL) 1,000 Units, Oral, DAILY       BP (!) 155/91   Pulse 75   Temp 36.7 °C (98.1 °F) (Temporal)   Resp 16   Ht 1.753 m (5' 9\")   Wt 99.8 kg (220 lb 0.3 oz)   SpO2 95%     No Known Allergies    Lab Results   Component Value Date/Time    SODIUM 139 04/02/2024 12:39 PM    POTASSIUM 4.4 04/02/2024 12:39 PM    CHLORIDE 104 04/02/2024 12:39 PM    CO2 24 04/02/2024 12:39 PM    GLUCOSE 114 (H) 04/02/2024 12:39 PM    BUN 17 04/02/2024 12:39 PM    CREATININE 1.05 04/02/2024 12:39 PM        Lab Results   Component Value Date/Time    WBC 5.0 04/09/2024 09:42 AM    RBC 4.94 04/09/2024 09:42 AM    HEMOGLOBIN 15.2 04/09/2024 09:42 AM    HEMATOCRIT 44.9 04/09/2024 09:42 AM    MCV 90.9 04/09/2024 09:42 AM    MCH 30.8 04/09/2024 09:42 AM    MCHC 33.9 04/09/2024 09:42 AM    MPV 11.6 04/09/2024 09:42 AM    NEUTSPOLYS 68.00 04/09/2024 09:42 AM    LYMPHOCYTES 20.60 (L) 04/09/2024 09:42 AM    MONOCYTES 8.80 04/09/2024 09:42 AM    EOSINOPHILS 1.60 04/09/2024 09:42 AM    BASOPHILS 0.80 04/09/2024 09:42 AM          Relevant Problems   CARDIAC   (positive) AF (atrial fibrillation) (HCC)   (positive) Essential hypertension, benign   (positive) Hypertensive urgency      ENDO   (positive) Hypothyroidism       Physical Exam    Airway   Mallampati: II  TM distance: >3 FB  Neck ROM: full       Cardiovascular   Rhythm: irregular  Rate: abnormal  (-) murmur     Dental - normal exam           Pulmonary - normal exam  Breath sounds clear to auscultation     Abdominal    Neurological - normal exam                   Anesthesia Plan    ASA 3   ASA physical status 3 criteria: a thrombophilic disease requiring anticoagulation and hypertension - poorly controlled    Plan - general       Airway plan will be natural airway          Induction: intravenous      Pertinent diagnostic labs and testing reviewed    Informed Consent:    Anesthetic plan and " risks discussed with patient.        Anesthetic plan discussed with patient in detail. Plan for general anesthesia with a natural airway and oxygen supplementation. Risks discussed include but are not limited to awareness, aspiration, allergic reaction, need for ventilatory assistance including intubation, and cardiopulmonary problems up to and including death. All questions answered and patient fully consents to plan as described.

## 2024-04-19 NOTE — ANESTHESIA TIME REPORT
Anesthesia Start and Stop Event Times       Date Time Event    4/19/2024 1003 Ready for Procedure     1008 Anesthesia Start     1030 Anesthesia Stop          Responsible Staff  04/19/24      Name Role Begin End    Qamar Colon M.D. Anesth 1008 1030          Overtime Reason:  no overtime (within assigned shift)    Comments:

## 2024-05-20 ENCOUNTER — HOSPITAL ENCOUNTER (OUTPATIENT)
Dept: LAB | Facility: MEDICAL CENTER | Age: 79
End: 2024-05-20
Attending: UROLOGY
Payer: MEDICARE

## 2024-05-21 LAB — PSA SERPL-MCNC: 2.74 NG/ML (ref 0–4)

## 2024-06-04 DIAGNOSIS — I48.0 PAROXYSMAL ATRIAL FIBRILLATION (HCC): ICD-10-CM

## 2024-06-05 RX ORDER — FLECAINIDE ACETATE 50 MG/1
50 TABLET ORAL 2 TIMES DAILY
Qty: 180 TABLET | Refills: 0 | Status: SHIPPED | OUTPATIENT
Start: 2024-06-05

## 2024-06-24 ENCOUNTER — TELEPHONE (OUTPATIENT)
Dept: CARDIOLOGY | Facility: MEDICAL CENTER | Age: 79
End: 2024-06-24
Payer: COMMERCIAL

## 2024-06-24 ENCOUNTER — NON-PROVIDER VISIT (OUTPATIENT)
Dept: CARDIOLOGY | Facility: MEDICAL CENTER | Age: 79
End: 2024-06-24
Attending: NURSE PRACTITIONER
Payer: COMMERCIAL

## 2024-06-24 DIAGNOSIS — I48.0 PAROXYSMAL ATRIAL FIBRILLATION (HCC): ICD-10-CM

## 2024-06-24 PROCEDURE — 93005 ELECTROCARDIOGRAM TRACING: CPT

## 2024-06-24 NOTE — TELEPHONE ENCOUNTER
Phone Number Called: [phone]     Call outcome: Spoke to patient regarding message below.    Message: Called to discuss patients concerns regarding Afib.   Patient has been in Afib for about a week. Colonoscopy scheduled July 9th.   [facility] Eliquis on July 6th and resume 2 days after. Discussed with patient, we have not [facility].   Feels palpations and little SOB at times.     Reporting taking flec, eliquis, and metoprolol. No missed doses.   Pt reporting vitals are stable.     Discussed with patient I will reach out to MT in regards to colonoscopy and further recommendations for Afib symptoms.   Patient verbalized understanding.

## 2024-06-24 NOTE — TELEPHONE ENCOUNTER
S/w patient regarding MT recommendations and EKG. Discussed with patient to increase flec 100mg BID, patient is okay with proceeding with cardioversion on Friday the 28th.

## 2024-06-24 NOTE — PROGRESS NOTES
Patient was here today for EKG. EKG performed and transferred into patients chart. Abi BUI was notified because she's working remote. RN will contact patient to follow up unless patient is symptomatic.   Is patient reporting any symptoms? Yes, but he stated his fine and provider MT knows.   If yes, RN to visit exam room

## 2024-06-24 NOTE — TELEPHONE ENCOUNTER
MT    Caller: Júnior Norton    Topic/issue: MEDICAL ADVICE    Júnior states that since Friday of last week he has been in afib. He would like to know what he needs to do now. Please advise.    Thank you,  Darvin DUBON    Callback Number: [phone]

## 2024-06-24 NOTE — TELEPHONE ENCOUNTER
Patient is scheduled on 6-28-24 for a Cardioversion with anesthesia with . No meds to stop and patient to check in at 11:00 for a 1:00 procedure. Updated H&P to be done on admit by NP. Pre admit to call patient.

## 2024-06-25 ENCOUNTER — APPOINTMENT (OUTPATIENT)
Dept: ADMISSIONS | Facility: MEDICAL CENTER | Age: 79
End: 2024-06-25
Attending: INTERNAL MEDICINE
Payer: COMMERCIAL

## 2024-06-26 ENCOUNTER — PRE-ADMISSION TESTING (OUTPATIENT)
Dept: ADMISSIONS | Facility: MEDICAL CENTER | Age: 79
End: 2024-06-26
Attending: INTERNAL MEDICINE
Payer: COMMERCIAL

## 2024-06-26 LAB — EKG IMPRESSION: NORMAL

## 2024-06-26 PROCEDURE — 93010 ELECTROCARDIOGRAM REPORT: CPT | Performed by: INTERNAL MEDICINE

## 2024-06-28 ENCOUNTER — APPOINTMENT (OUTPATIENT)
Dept: CARDIOLOGY | Facility: MEDICAL CENTER | Age: 79
End: 2024-06-28
Attending: NURSE PRACTITIONER
Payer: COMMERCIAL

## 2024-06-28 ENCOUNTER — HOSPITAL ENCOUNTER (OUTPATIENT)
Facility: MEDICAL CENTER | Age: 79
End: 2024-06-28
Attending: INTERNAL MEDICINE | Admitting: INTERNAL MEDICINE
Payer: COMMERCIAL

## 2024-06-28 VITALS
RESPIRATION RATE: 18 BRPM | WEIGHT: 212.08 LBS | BODY MASS INDEX: 31.41 KG/M2 | OXYGEN SATURATION: 93 % | TEMPERATURE: 97 F | HEIGHT: 69 IN | SYSTOLIC BLOOD PRESSURE: 139 MMHG | HEART RATE: 55 BPM | DIASTOLIC BLOOD PRESSURE: 71 MMHG

## 2024-06-28 DIAGNOSIS — I48.0 PAROXYSMAL ATRIAL FIBRILLATION (HCC): ICD-10-CM

## 2024-06-28 LAB — EKG IMPRESSION: NORMAL

## 2024-06-28 PROCEDURE — 93005 ELECTROCARDIOGRAM TRACING: CPT | Performed by: INTERNAL MEDICINE

## 2024-06-28 PROCEDURE — 93010 ELECTROCARDIOGRAM REPORT: CPT | Performed by: INTERNAL MEDICINE

## 2024-06-28 ASSESSMENT — FIBROSIS 4 INDEX: FIB4 SCORE: 7.85

## 2024-06-28 NOTE — PROGRESS NOTES
Patient pre-op EKG shows sinus stewart. Procedure canceled by Dr. Bernstein.    Pt discharged with all personal belonging present.

## 2024-07-03 ENCOUNTER — TELEPHONE (OUTPATIENT)
Dept: CARDIOLOGY | Facility: MEDICAL CENTER | Age: 79
End: 2024-07-03
Payer: COMMERCIAL

## 2024-07-05 ENCOUNTER — TELEPHONE (OUTPATIENT)
Dept: CARDIOLOGY | Facility: MEDICAL CENTER | Age: 79
End: 2024-07-05
Payer: COMMERCIAL

## 2024-07-15 ENCOUNTER — TELEPHONE (OUTPATIENT)
Dept: CARDIOLOGY | Facility: MEDICAL CENTER | Age: 79
End: 2024-07-15
Payer: COMMERCIAL

## 2024-07-18 ENCOUNTER — TELEPHONE (OUTPATIENT)
Dept: CARDIOLOGY | Facility: MEDICAL CENTER | Age: 79
End: 2024-07-18
Payer: COMMERCIAL

## 2024-07-18 DIAGNOSIS — I48.0 PAROXYSMAL ATRIAL FIBRILLATION (HCC): ICD-10-CM

## 2024-07-18 RX ORDER — FLECAINIDE ACETATE 100 MG/1
100 TABLET ORAL 2 TIMES DAILY
Qty: 180 TABLET | Refills: 0 | Status: SHIPPED | OUTPATIENT
Start: 2024-07-18

## 2024-08-22 ENCOUNTER — OFFICE VISIT (OUTPATIENT)
Dept: CARDIOLOGY | Facility: MEDICAL CENTER | Age: 79
End: 2024-08-22
Attending: NURSE PRACTITIONER
Payer: COMMERCIAL

## 2024-08-22 VITALS
HEART RATE: 52 BPM | BODY MASS INDEX: 31.4 KG/M2 | RESPIRATION RATE: 16 BRPM | OXYGEN SATURATION: 93 % | DIASTOLIC BLOOD PRESSURE: 60 MMHG | SYSTOLIC BLOOD PRESSURE: 126 MMHG | HEIGHT: 69 IN | WEIGHT: 212 LBS

## 2024-08-22 DIAGNOSIS — Z79.899 ENCOUNTER FOR MONITORING FLECAINIDE THERAPY: ICD-10-CM

## 2024-08-22 DIAGNOSIS — I10 ESSENTIAL HYPERTENSION, BENIGN: ICD-10-CM

## 2024-08-22 DIAGNOSIS — I48.0 PAROXYSMAL ATRIAL FIBRILLATION (HCC): ICD-10-CM

## 2024-08-22 DIAGNOSIS — Z51.81 ENCOUNTER FOR MONITORING FLECAINIDE THERAPY: ICD-10-CM

## 2024-08-22 PROCEDURE — 3074F SYST BP LT 130 MM HG: CPT | Performed by: NURSE PRACTITIONER

## 2024-08-22 PROCEDURE — 99214 OFFICE O/P EST MOD 30 MIN: CPT | Performed by: NURSE PRACTITIONER

## 2024-08-22 PROCEDURE — 99213 OFFICE O/P EST LOW 20 MIN: CPT | Performed by: NURSE PRACTITIONER

## 2024-08-22 PROCEDURE — 3078F DIAST BP <80 MM HG: CPT | Performed by: NURSE PRACTITIONER

## 2024-08-22 PROCEDURE — 93005 ELECTROCARDIOGRAM TRACING: CPT | Performed by: NURSE PRACTITIONER

## 2024-08-22 RX ORDER — METOPROLOL SUCCINATE 50 MG/1
50 TABLET, EXTENDED RELEASE ORAL DAILY
Qty: 90 TABLET | Refills: 3 | Status: SHIPPED | OUTPATIENT
Start: 2024-08-22

## 2024-08-22 ASSESSMENT — ENCOUNTER SYMPTOMS
NAUSEA: 0
MUSCULOSKELETAL NEGATIVE: 1
NEUROLOGICAL NEGATIVE: 1
EYES NEGATIVE: 1
NERVOUS/ANXIOUS: 0
SHORTNESS OF BREATH: 0
BRUISES/BLEEDS EASILY: 0
CONSTITUTIONAL NEGATIVE: 1
GASTROINTESTINAL NEGATIVE: 1
DEPRESSION: 0
WHEEZING: 0
PALPITATIONS: 0
PND: 0
DIZZINESS: 0
ORTHOPNEA: 0
SENSORY CHANGE: 0
CLAUDICATION: 0
RESPIRATORY NEGATIVE: 1
HALLUCINATIONS: 0

## 2024-08-22 ASSESSMENT — FIBROSIS 4 INDEX: FIB4 SCORE: 7.85

## 2024-08-22 NOTE — ASSESSMENT & PLAN NOTE
- well controlled    - continue all medications as is except decrease metoprolol to 50mg due to bradycardia

## 2024-08-22 NOTE — PROGRESS NOTES
Atrial Fibrillation Follow up Note    DOS: 8/22/2024    [id number]  Júnior Gasca Errol    Chief complaint/Reason for consult: flecainide and PAF    HPI: Pt is a 78 y.o. male who presents to the clinic today in follow up for atrial fibrillation.  Patient has a past medical history significant for but not limited to: hypertension, BPH S/P TURP , dyslipidemia, hypothyroidism, recent hematuria episode, acute thrombocytopenia episode, atrial fibrillation, high risk medication flecainide. Doing well on flecainide with no AF recurrence. No more issues with thrombocytopenia. Active throughout week. Walks dog and golf. No chest pain or palpitations. Briefly discussed Watchman. Tolerating Eliquis fine. Metoprolol cut in half due to bradycardia. Annual follow up with labs       Past Medical History:   Diagnosis Date    Arrhythmia 03/29/2024    afib    Asthma     as a child    Breath shortness     Cataract     bilat IOL    Depression     Hemorrhagic disorder (HCC) 01/10/2024    hematuria (due to Aleve use)    High cholesterol     Hyperlipemia     Hypertension     Hypothyroid     Pain     left knee       Past Surgical History:   Procedure Laterality Date    OTHER Bilateral 2021    vitrectomy both eyes    SUBMANDIBLE ABSCESS INCISION AND DRAINAGE Left 09/15/2018    Procedure: SUBMANDIBLE ABSCESS INCISION AND DRAINAGE;  Surgeon: Adarsh Gee M.D.;  Location: SURGERY [facility];  Service: Dental    DENTAL EXTRACTION(S) Left 09/15/2018    Procedure: DENTAL EXTRACTION(S) #18;  Surgeon: Adarsh Gee M.D.;  Location: SURGERY [facility];  Service: Dental    CATARACT EXTRACTION WITH IOL Bilateral     OTHER      OTHER      TURP    OTHER ORTHOPEDIC SURGERY Bilateral     knees (ACL and meniscus)       Social History     Socioeconomic History    Marital status:      Spouse name: Not on file    Number of children: Not on file    Years of education: Not on file    Highest education level: Not on file   Occupational  History    Not on file   Tobacco Use    Smoking status: Former     Current packs/day: 0.00     Average packs/day: 1 pack/day for 10.0 years (10.0 ttl pk-yrs)     Types: Cigarettes     Start date:      Quit date:      Years since quittin.6    Smokeless tobacco: Never   Vaping Use    Vaping status: Never Used   Substance and Sexual Activity    Alcohol use: Not Currently    Drug use: Never    Sexual activity: Not on file   Other Topics Concern    Not on file   Social History Narrative    Not on file     Social Determinants of Health     Financial Resource Strain: Not on file   Food Insecurity: Not on file   Transportation Needs: Not on file   Physical Activity: Not on file   Stress: Not on file   Social Connections: Not on file   Intimate Partner Violence: Not on file   Housing Stability: Not on file       History reviewed. No pertinent family history.    No Known Allergies    Current Outpatient Medications   Medication Sig Dispense Refill    metoprolol SR (TOPROL XL) 50 MG TABLET SR 24 HR Take 1 Tablet by mouth every day. 90 Tablet 3    flecainide (TAMBOCOR) 100 MG Tab Take 1 Tablet by mouth 2 times a day. 180 Tablet 0    OMEGA-3 FATTY ACIDS PO Take 1 Tablet by mouth every day.       MEDICATION INSTRUCTIONS FOR SURGERY/PROCEDURE SCHEDULED FOR    DO NOT TAKE 7 DAYS PRIOR TO SURGERY      magnesium oxide (MAG-OX) 400 MG Tab tablet Take 400 mg by mouth every day.     MEDICATION INSTRUCTIONS FOR SURGERY/PROCEDURE SCHEDULED FOR    DO NOT TAKE 7 DAYS PRIOR TO SURGERY      apixaban (ELIQUIS) 5mg Tab Take 1 Tablet by mouth 2 times a day. (Patient taking differently: Take 5 mg by mouth 2 times a day.       COORDINATE MEDICATION INSTRUCTIONS FROM PRESCRIBING PHYSICIAN) 180 Tablet 3    Multiple Vitamins-Minerals (PRESERVISION AREDS PO) Take 1 Tablet by mouth 2 times a day.       MEDICATION INSTRUCTIONS FOR SURGERY/PROCEDURE SCHEDULED FOR    DO NOT TAKE 7 DAYS PRIOR TO SURGERY      Multiple  Vitamins-Minerals (CENTRUM SILVER 50+MEN PO) Take 1 Tablet by mouth every day.       MEDICATION INSTRUCTIONS FOR SURGERY/PROCEDURE SCHEDULED FOR 6/28   DO NOT TAKE 7 DAYS PRIOR TO SURGERY      levothyroxine (SYNTHROID) 125 MCG Tab Take 125 mcg by mouth every morning on an empty stomach.     CONTINUE TAKING MED PRIOR TO SURGERY 6/28      amLODIPine (NORVASC) 10 MG Tab Take 10 mg by mouth every day.       CONTINUE TAKING MED PRIOR TO SURGERY 6/28      lisinopril (PRINIVIL, ZESTRIL) 40 MG tablet Take 40 mg by mouth every day.       MEDICATION INSTRUCTIONS FOR SURGERY/PROCEDURE SCHEDULED FOR 6/28   DO NOT TAKE 24 HOURS PRIOR TO SURGERY      atorvastatin (LIPITOR) 20 MG Tab Take 20 mg by mouth every evening.       CONTINUE TAKING MED PRIOR TO SURGERY 6/28      mirtazapine (REMERON) 45 MG tablet Take 45 mg by mouth every evening.       CONTINUE TAKING MED PRIOR TO SURGERY 6/28      vitamin D (CHOLECALCIFEROL) 1000 UNIT Tab Take 1,000 Units by mouth every day.       MEDICATION INSTRUCTIONS FOR SURGERY/PROCEDURE SCHEDULED FOR 6/28   DO NOT TAKE 7 DAYS PRIOR TO SURGERY       No current facility-administered medications for this visit.       Vitals:    08/22/24 1016   BP: 126/60   Pulse: (!) 52   Resp: 16   SpO2: 93%         Review of Systems   Constitutional: Negative.  Negative for malaise/fatigue.   HENT: Negative.     Eyes: Negative.    Respiratory: Negative.  Negative for shortness of breath and wheezing.    Cardiovascular:  Negative for chest pain, palpitations, orthopnea, claudication, leg swelling and PND.   Gastrointestinal: Negative.  Negative for nausea.   Genitourinary: Negative.    Musculoskeletal: Negative.    Skin: Negative.    Neurological: Negative.  Negative for dizziness and sensory change.   Endo/Heme/Allergies: Negative.  Does not bruise/bleed easily.   Psychiatric/Behavioral:  Negative for depression and hallucinations. The patient is not nervous/anxious.         EKG: Sinus stewart     Physical  "Exam  Constitutional:       Appearance: Normal appearance.   HENT:      Head: Normocephalic.   Eyes:      Pupils: Pupils are equal, round, and reactive to light.   Neck:      Vascular: No JVD.   Cardiovascular:      Rate and Rhythm: Regular rhythm. Bradycardia present.      Pulses: Normal pulses.      Heart sounds: Normal heart sounds.   Pulmonary:      Effort: Pulmonary effort is normal.      Breath sounds: Normal breath sounds.   Abdominal:      General: Abdomen is flat.      Palpations: Abdomen is soft.   Musculoskeletal:      Cervical back: Normal range of motion.      Right lower leg: No edema.      Left lower leg: No edema.   Skin:     General: Skin is warm and dry.   Neurological:      Mental Status: He is alert and oriented to person, place, and time.   Psychiatric:         Mood and Affect: Mood normal.         Behavior: Behavior normal.          Data:  Lipids:   No results found for: \"CHOLSTRLTOT\", \"TRIGLYCERIDE\", \"HDL\", \"LDL\"     BMP:  Lab Results   Component Value Date/Time    SODIUM 142 01/12/2024 0157    POTASSIUM 3.8 01/12/2024 0157    CHLORIDE 103 01/12/2024 0157    CO2 22 01/12/2024 0157    GLUCOSE 76 01/12/2024 0157    BUN 14 01/12/2024 0157    CREATININE 0.86 01/12/2024 0157    CALCIUM 8.8 01/12/2024 0157    ANION 17.0 (H) 01/12/2024 0157       GFR:  Lab Results   Component Value Date/Time    IFAFRICA >60 09/15/2018 0930    IFNOTAFR >60 09/15/2018 0930        TSH:   Lab Results   Component Value Date/Time    TSHULTRASEN 3.290 01/11/2024 0331       MAGNESIUM:  Lab Results   Component Value Date/Time    MAGNESIUM 2.3 01/10/2024 2236        THYROXINE (T4):   No results found for: \"FREEDIR\"     CBC:   Lab Results   Component Value Date/Time    WBC 5.0 04/09/2024 09:42 AM    RBC 4.94 04/09/2024 09:42 AM    HEMOGLOBIN 15.2 04/09/2024 09:42 AM    HEMATOCRIT 44.9 04/09/2024 09:42 AM    MCV 90.9 04/09/2024 09:42 AM    MCH 30.8 04/09/2024 09:42 AM    MCHC 33.9 04/09/2024 09:42 AM    RDW 43.7 04/09/2024 09:42 " "AM    PLATELETCT 44 (L) 04/09/2024 09:42 AM    MPV 11.6 04/09/2024 09:42 AM    NEUTSPOLYS 68.00 04/09/2024 09:42 AM    LYMPHOCYTES 20.60 (L) 04/09/2024 09:42 AM    MONOCYTES 8.80 04/09/2024 09:42 AM    EOSINOPHILS 1.60 04/09/2024 09:42 AM    BASOPHILS 0.80 04/09/2024 09:42 AM    IMMGRAN 0.20 04/09/2024 09:42 AM    NRBC 0.00 04/09/2024 09:42 AM    NEUTS 3.39 04/09/2024 09:42 AM    LYMPHS 1.03 04/09/2024 09:42 AM    MONOS 0.44 04/09/2024 09:42 AM    EOS 0.08 04/09/2024 09:42 AM    BASO 0.04 04/09/2024 09:42 AM    IMMGRANAB 0.01 04/09/2024 09:42 AM    NRBCAB 0.00 04/09/2024 09:42 AM        CBC w/o DIFF  Lab Results   Component Value Date/Time    WBC 5.0 04/09/2024 09:42 AM    RBC 4.94 04/09/2024 09:42 AM    HEMOGLOBIN 15.2 04/09/2024 09:42 AM    MCV 90.9 04/09/2024 09:42 AM    MCH 30.8 04/09/2024 09:42 AM    MCHC 33.9 04/09/2024 09:42 AM    RDW 43.7 04/09/2024 09:42 AM    MPV 11.6 04/09/2024 09:42 AM       LIVER:  Lab Results   Component Value Date/Time    ALKPHOSPHAT 69 04/02/2024 12:39 PM    ASTSGOT 17 04/02/2024 12:39 PM    ALTSGPT 14 04/02/2024 12:39 PM    TBILIRUBIN 0.4 04/02/2024 12:39 PM       BNP:  No results found for: \"BNPBTYPENAT\"    PT/INR:  Lab Results   Component Value Date/Time    PROTHROMBTM 14.5 04/02/2024 12:39 PM    PROTHROMBTM 13.2 01/10/2024 10:36 PM    PROTHROMBTM 13.6 09/15/2018 09:30 AM    INR 1.11 04/02/2024 12:39 PM    INR 0.99 01/10/2024 10:36 PM    INR 1.05 09/15/2018 09:30 AM             Impression/Plan:  AF (atrial fibrillation) (HCC)   - doing well  on rhythm and rate control   - rate control with metoprolol   - cut down o 50mg daily as he is bradycardic today (asymptomatic)   - rhythm control with flecainide   - annual visit with EKG   - annual CMP and magnesium     Encounter for monitoring flecainide therapy   - QRS 80 measured by hand   - continue current dose 100mg twice daily   - no bundle branch block   - annual labs     Essential hypertension, benign   - well controlled    - " continue all medications as is except decrease metoprolol to 50mg due to bradycardia                  A total of 30 minutes of time was spent on day of encounter reviewing medical record, performing history and examination, counseling, ordering medication/test/consults, collaborating with referring service, and documentation.    Shon Fung [facility]  Cardiac Electrophysiology

## 2024-08-22 NOTE — ASSESSMENT & PLAN NOTE
- doing well  on rhythm and rate control   - rate control with metoprolol   - cut down o 50mg daily as he is bradycardic today (asymptomatic)   - rhythm control with flecainide   - annual visit with EKG   - annual CMP and magnesium

## 2024-08-22 NOTE — ASSESSMENT & PLAN NOTE
- QRS 80 measured by hand   - continue current dose 100mg twice daily   - no bundle branch block   - annual labs

## 2024-08-26 LAB
EKG IMPRESSION: NORMAL
EKG IMPRESSION: NORMAL

## 2024-10-28 ENCOUNTER — TELEPHONE (OUTPATIENT)
Dept: CARDIOLOGY | Facility: MEDICAL CENTER | Age: 79
End: 2024-10-28
Payer: COMMERCIAL

## 2024-10-28 DIAGNOSIS — I48.0 PAROXYSMAL ATRIAL FIBRILLATION (HCC): ICD-10-CM

## 2024-10-28 RX ORDER — FLECAINIDE ACETATE 100 MG/1
100 TABLET ORAL 2 TIMES DAILY
Qty: 180 TABLET | Refills: 1 | Status: SHIPPED | OUTPATIENT
Start: 2024-10-28

## 2025-05-13 ENCOUNTER — OFFICE VISIT (OUTPATIENT)
Dept: DERMATOLOGY | Facility: IMAGING CENTER | Age: 80
End: 2025-05-13
Payer: MEDICARE

## 2025-05-13 DIAGNOSIS — L57.0 ACTINIC KERATOSIS: ICD-10-CM

## 2025-05-13 DIAGNOSIS — L82.1 STUCCO KERATOSES: ICD-10-CM

## 2025-05-13 DIAGNOSIS — D18.01 CHERRY ANGIOMA: ICD-10-CM

## 2025-05-13 DIAGNOSIS — Z85.828 HISTORY OF BASAL CELL CARCINOMA (BCC): ICD-10-CM

## 2025-05-13 DIAGNOSIS — Z12.83 SKIN CANCER SCREENING: ICD-10-CM

## 2025-05-13 DIAGNOSIS — D22.9 MULTIPLE NEVI: ICD-10-CM

## 2025-05-13 DIAGNOSIS — L81.4 LENTIGINES: ICD-10-CM

## 2025-05-13 DIAGNOSIS — L82.1 SK (SEBORRHEIC KERATOSIS): ICD-10-CM

## 2025-05-13 PROCEDURE — 17003 DESTRUCT PREMALG LES 2-14: CPT | Performed by: NURSE PRACTITIONER

## 2025-05-13 PROCEDURE — 99213 OFFICE O/P EST LOW 20 MIN: CPT | Mod: 25 | Performed by: NURSE PRACTITIONER

## 2025-05-13 PROCEDURE — 17000 DESTRUCT PREMALG LESION: CPT | Performed by: NURSE PRACTITIONER

## 2025-05-13 NOTE — PROGRESS NOTES
DERMATOLOGY NOTE  FOLLOW UP VISIT       Chief complaint: RAJESH     HPI/location: Neck   Time present: 2/3 weeks  Painful lesion: No  Itching lesion: No  Enlarging lesion: No          Hx of AK  forehead crown vertex,Lt temple, bilateral preauricular  History of skin cancer: Yes, Details: BCC right cheek  Mohs approx 2012, BCC on right hip approx 1991 or 1992    Several AK treated through the years   History of biopsies:Yes, Details:   History of blistering/severe sunburns:Yes, Details:   Family history of skin cancer:No  Family history of atypical moles:No     Tobacco use: Former smoker. Quit 53  years ago. Smoked 3 pack per day for 10 years  Alcohol use:denies alcohol consumption  Allergies:No        No Known Allergies     MEDICATIONS:  Medications relevant to specialty reviewed.     REVIEW OF SYSTEMS:   Positive for skin (see HPI)  Negative for fevers and chills       EXAM:  There were no vitals taken for this visit.  Constitutional: Well-developed, well-nourished, and in no distress.     A total body skin exam was performed excluding the genitals per patient preference and including the following areas: head (including face), neck, chest, abdomen, groin/buttocks, back, bilateral upper extremities, and bilateral lower extremities with the following pertinent findings listed below and/or in assessment/plan.         -sun exposed skin of trunk and b/l upper, lower extremities and face with scattered clinically benign light brown reticulated macules all of which were morphologically similar and none of which were suspicious for skin cancer today on exam  Several scattered 1-3mm bright red macules and thin papules on the trunk, scalp and extremities   Several tan medium brown skin-colored stuck-on waxy papules scattered on the trunk and extremities  Multiple tan medium brown dark brown skin-colored macules papules scattered over the trunk and extremities, All with benign-appearing pigment network patterns on  dermoscopy  Xerosis cutis bilateral lower extremity   Stucco keratosis bilateral lower extremity   Ill-defined erythematous gritty/scaly papule over the crown and bilateral cheeks/preauricular region      IMPRESSION / PLAN:      1. Actinic keratosis  CRYOTHERAPY:  Risks (including, but not limited to: skin discoloration, redness, blister, blood blister, recurrence, need for further treatment, infection, scar) and benefits of cryotherapy discussed. Patient verbally agreed to proceed with treatment. 1 cryotherapy freeze thaw cycles of 10 seconds were applied to 10 lesions on areas as noted on exam with cryac. Patient tolerated procedure well. Aftercare instructions given--no specific care needed unless irritated during healing process, can apply Vaseline with small band-aid if needed.      2. Lentigines  - Benign-appearing nature of lesions discussed during exam.   - Advised to continue to monitor for any return to clinic for new or concerning changes.      3. Multiple nevi  - Benign-appearing nature of lesions discussed during exam.   - Advised to continue to monitor for any return to clinic for new or concerning changes.  - ABCDE's of melanoma discussed/handout given      4. SK (seborrheic keratosis)  - Benign-appearing nature of lesions discussed during exam.   - Advised to continue to monitor for any return to clinic for new or concerning changes.      5. Cherry angioma  - Benign-appearing nature of lesions discussed during exam.   - Advised to continue to monitor for any return to clinic for new or concerning changes.      6. Stucco keratoses  - Benign-appearing nature of lesions discussed during exam.   - Advised to continue to monitor for any return to clinic for new or concerning changes.      7. History of basal cell carcinoma (BCC)  Annual TSEs    8. Skin cancer screening  Skin cancer education  discussed importance of sun protective clothing, eyewear in addition to the use of broad spectrum sunscreen with  SPF 30 or greater, as well as need for reapplication ~every 2 hours when exposed to UVR/handout given  discussed importance following up for any new or changing lesions as noted in handout given, but every 12 months exams in clinic in the setting of dermatologic history  ABCDE's of melanoma discussed/handout given            Discussed risks associated with LN2, Patient verbalized understanding and agrees with plan regarding the above    I have performed a physical exam and reviewed and updated ROS and Plan today (5/13/2025). In review of dermatology visit (8/29/2023), there are no changes except as documented above.       Please note that this dictation was created using voice recognition software. I have made every reasonable attempt to correct obvious errors, but I expect that there are errors of grammar and possibly content that I did not discover before finalizing the note.      Return to clinic in: Return in about 1 year (around 5/13/2026) for RAJESH. and as needed for any new or changing skin lesions.

## 2025-08-14 ENCOUNTER — RESULTS FOLLOW-UP (OUTPATIENT)
Dept: CARDIOLOGY | Facility: MEDICAL CENTER | Age: 80
End: 2025-08-14
Payer: COMMERCIAL

## 2025-08-14 ENCOUNTER — HOSPITAL ENCOUNTER (OUTPATIENT)
Dept: LAB | Facility: MEDICAL CENTER | Age: 80
End: 2025-08-14
Attending: NURSE PRACTITIONER
Payer: COMMERCIAL

## 2025-08-14 DIAGNOSIS — Z51.81 ENCOUNTER FOR MONITORING FLECAINIDE THERAPY: ICD-10-CM

## 2025-08-14 DIAGNOSIS — Z79.899 ENCOUNTER FOR MONITORING FLECAINIDE THERAPY: ICD-10-CM

## 2025-08-14 LAB
ANION GAP SERPL CALC-SCNC: 10 MMOL/L (ref 7–16)
BUN SERPL-MCNC: 14 MG/DL (ref 8–22)
CALCIUM SERPL-MCNC: 9.1 MG/DL (ref 8.5–10.5)
CHLORIDE SERPL-SCNC: 107 MMOL/L (ref 96–112)
CO2 SERPL-SCNC: 25 MMOL/L (ref 20–33)
CREAT SERPL-MCNC: 1.13 MG/DL (ref 0.5–1.4)
GFR SERPLBLD CREATININE-BSD FMLA CKD-EPI: 67 ML/MIN/1.73 M 2
GLUCOSE SERPL-MCNC: 103 MG/DL (ref 65–99)
MAGNESIUM SERPL-MCNC: 2.3 MG/DL (ref 1.5–2.5)
POTASSIUM SERPL-SCNC: 4.3 MMOL/L (ref 3.6–5.5)
SODIUM SERPL-SCNC: 142 MMOL/L (ref 135–145)

## 2025-08-14 PROCEDURE — 83735 ASSAY OF MAGNESIUM: CPT

## 2025-08-14 PROCEDURE — 80048 BASIC METABOLIC PNL TOTAL CA: CPT

## 2025-08-14 PROCEDURE — 36415 COLL VENOUS BLD VENIPUNCTURE: CPT

## (undated) DEVICE — SODIUM CHL IRRIGATION 0.9% 1000ML (12EA/CA)

## (undated) DEVICE — GOWN WARMING STANDARD FLEX - (30/CA)

## (undated) DEVICE — PACK MINOR BASIN - (2EA/CA)

## (undated) DEVICE — SUCTION INSTRUMENT YANKAUER BULBOUS TIP W/O VENT (50EA/CA)

## (undated) DEVICE — CANISTER SUCTION RIGID RED 1500CC (40EA/CA)

## (undated) DEVICE — HEAD HOLDER JUNIOR/ADULT

## (undated) DEVICE — WATER IRRIGATION STERILE 1000ML (12EA/CA)

## (undated) DEVICE — BOVIE NEEDLE TIP INSULATD NON-SAFETY 2CM (50/PK)

## (undated) DEVICE — DRAIN PENROSE STERILE 1/4 X - 18 IN  (25EA/BX)

## (undated) DEVICE — GLOVE BIOGEL SZ 8 SURGICAL PF LTX - (50PR/BX 4BX/CA)

## (undated) DEVICE — KIT ANESTHESIA W/CIRCUIT & 3/LT BAG W/FILTER (20EA/CA)

## (undated) DEVICE — MASK ANESTHESIA ADULT  - (100/CA)

## (undated) DEVICE — SUTURE GENERAL

## (undated) DEVICE — KIT ROOM DECONTAMINATION

## (undated) DEVICE — GLOVE SZ 7.5 BIOGEL PI MICRO - PF LF (50PR/BX)

## (undated) DEVICE — DRAPE SURGICAL U 77X120 - (10/CA)

## (undated) DEVICE — GLOVE, LITE (PAIR)

## (undated) DEVICE — TUBE E-T HI-LO CUFF 7.5MM (10EA/PK)

## (undated) DEVICE — NEPTUNE 4 PORT MANIFOLD - (20/PK)

## (undated) DEVICE — TUBE CONNECTING SUCTION - CLEAR PLASTIC STERILE 72 IN (50EA/CA)

## (undated) DEVICE — SPONGE PEANUT - (5/PK 50PK/CA)

## (undated) DEVICE — ELECTRODE 850 FOAM ADHESIVE - HYDROGEL RADIOTRNSPRNT (50/PK)

## (undated) DEVICE — HUMID-VENT HEAT AND MOISTURE EXCHANGE- (50/BX)

## (undated) DEVICE — BLADE SURGICAL #11 - (50/BX)

## (undated) DEVICE — SUTURE 3-0 CHROMIC GUT SH 27 (36PK/BX)"

## (undated) DEVICE — SENSOR SPO2 NEO LNCS ADHESIVE (20/BX) SEE USER NOTES

## (undated) DEVICE — PROTECTOR ULNA NERVE - (36PR/CA)

## (undated) DEVICE — ELECTRODE DUAL RETURN W/ CORD - (50/PK)

## (undated) DEVICE — GOWN SURGEONS X-LARGE - DISP. (30/CA)